# Patient Record
Sex: MALE | Race: OTHER | HISPANIC OR LATINO | Employment: UNEMPLOYED | URBAN - NONMETROPOLITAN AREA
[De-identification: names, ages, dates, MRNs, and addresses within clinical notes are randomized per-mention and may not be internally consistent; named-entity substitution may affect disease eponyms.]

---

## 2021-10-27 ENCOUNTER — EVALUATION (OUTPATIENT)
Dept: PHYSICAL THERAPY | Facility: CLINIC | Age: 51
End: 2021-10-27
Payer: OTHER MISCELLANEOUS

## 2021-10-27 DIAGNOSIS — M54.50 CHRONIC BILATERAL LOW BACK PAIN, UNSPECIFIED WHETHER SCIATICA PRESENT: ICD-10-CM

## 2021-10-27 DIAGNOSIS — Z98.1 S/P LUMBAR FUSION: Primary | ICD-10-CM

## 2021-10-27 DIAGNOSIS — M51.36 DEGENERATIVE DISC DISEASE, LUMBAR: ICD-10-CM

## 2021-10-27 DIAGNOSIS — G89.29 CHRONIC BILATERAL LOW BACK PAIN, UNSPECIFIED WHETHER SCIATICA PRESENT: ICD-10-CM

## 2021-10-27 PROCEDURE — 97162 PT EVAL MOD COMPLEX 30 MIN: CPT | Performed by: PHYSICAL THERAPIST

## 2021-10-27 PROCEDURE — 97110 THERAPEUTIC EXERCISES: CPT | Performed by: PHYSICAL THERAPIST

## 2021-10-27 PROCEDURE — 97014 ELECTRIC STIMULATION THERAPY: CPT | Performed by: PHYSICAL THERAPIST

## 2021-10-28 ENCOUNTER — OFFICE VISIT (OUTPATIENT)
Dept: PHYSICAL THERAPY | Facility: CLINIC | Age: 51
End: 2021-10-28
Payer: OTHER MISCELLANEOUS

## 2021-10-28 DIAGNOSIS — M54.50 CHRONIC BILATERAL LOW BACK PAIN, UNSPECIFIED WHETHER SCIATICA PRESENT: ICD-10-CM

## 2021-10-28 DIAGNOSIS — G89.29 CHRONIC BILATERAL LOW BACK PAIN, UNSPECIFIED WHETHER SCIATICA PRESENT: ICD-10-CM

## 2021-10-28 DIAGNOSIS — Z98.1 S/P LUMBAR FUSION: Primary | ICD-10-CM

## 2021-10-28 DIAGNOSIS — M51.36 DEGENERATIVE DISC DISEASE, LUMBAR: ICD-10-CM

## 2021-10-28 PROCEDURE — 97014 ELECTRIC STIMULATION THERAPY: CPT | Performed by: PHYSICAL THERAPIST

## 2021-10-28 PROCEDURE — 97140 MANUAL THERAPY 1/> REGIONS: CPT | Performed by: PHYSICAL THERAPIST

## 2021-10-28 PROCEDURE — 97112 NEUROMUSCULAR REEDUCATION: CPT | Performed by: PHYSICAL THERAPIST

## 2021-10-28 PROCEDURE — 97110 THERAPEUTIC EXERCISES: CPT | Performed by: PHYSICAL THERAPIST

## 2021-11-01 ENCOUNTER — OFFICE VISIT (OUTPATIENT)
Dept: PHYSICAL THERAPY | Facility: CLINIC | Age: 51
End: 2021-11-01
Payer: OTHER MISCELLANEOUS

## 2021-11-01 DIAGNOSIS — G89.29 CHRONIC BILATERAL LOW BACK PAIN, UNSPECIFIED WHETHER SCIATICA PRESENT: ICD-10-CM

## 2021-11-01 DIAGNOSIS — M51.36 DEGENERATIVE DISC DISEASE, LUMBAR: ICD-10-CM

## 2021-11-01 DIAGNOSIS — M54.50 CHRONIC BILATERAL LOW BACK PAIN, UNSPECIFIED WHETHER SCIATICA PRESENT: ICD-10-CM

## 2021-11-01 DIAGNOSIS — Z98.1 S/P LUMBAR FUSION: Primary | ICD-10-CM

## 2021-11-01 PROCEDURE — 97112 NEUROMUSCULAR REEDUCATION: CPT

## 2021-11-01 PROCEDURE — 97140 MANUAL THERAPY 1/> REGIONS: CPT

## 2021-11-01 PROCEDURE — 97014 ELECTRIC STIMULATION THERAPY: CPT

## 2021-11-01 PROCEDURE — 97110 THERAPEUTIC EXERCISES: CPT

## 2021-11-05 ENCOUNTER — OFFICE VISIT (OUTPATIENT)
Dept: PHYSICAL THERAPY | Facility: CLINIC | Age: 51
End: 2021-11-05
Payer: OTHER MISCELLANEOUS

## 2021-11-05 DIAGNOSIS — G89.29 CHRONIC BILATERAL LOW BACK PAIN, UNSPECIFIED WHETHER SCIATICA PRESENT: ICD-10-CM

## 2021-11-05 DIAGNOSIS — M51.36 DEGENERATIVE DISC DISEASE, LUMBAR: ICD-10-CM

## 2021-11-05 DIAGNOSIS — M54.50 CHRONIC BILATERAL LOW BACK PAIN, UNSPECIFIED WHETHER SCIATICA PRESENT: ICD-10-CM

## 2021-11-05 DIAGNOSIS — Z98.1 S/P LUMBAR FUSION: Primary | ICD-10-CM

## 2021-11-05 PROCEDURE — 97112 NEUROMUSCULAR REEDUCATION: CPT

## 2021-11-05 PROCEDURE — 97110 THERAPEUTIC EXERCISES: CPT

## 2021-11-05 PROCEDURE — 97014 ELECTRIC STIMULATION THERAPY: CPT

## 2021-11-05 PROCEDURE — 97140 MANUAL THERAPY 1/> REGIONS: CPT

## 2021-11-09 ENCOUNTER — OFFICE VISIT (OUTPATIENT)
Dept: PHYSICAL THERAPY | Facility: CLINIC | Age: 51
End: 2021-11-09
Payer: OTHER MISCELLANEOUS

## 2021-11-09 DIAGNOSIS — Z98.1 S/P LUMBAR FUSION: Primary | ICD-10-CM

## 2021-11-09 DIAGNOSIS — M51.36 DEGENERATIVE DISC DISEASE, LUMBAR: ICD-10-CM

## 2021-11-09 DIAGNOSIS — G89.29 CHRONIC BILATERAL LOW BACK PAIN, UNSPECIFIED WHETHER SCIATICA PRESENT: ICD-10-CM

## 2021-11-09 DIAGNOSIS — M54.50 CHRONIC BILATERAL LOW BACK PAIN, UNSPECIFIED WHETHER SCIATICA PRESENT: ICD-10-CM

## 2021-11-09 PROCEDURE — 97014 ELECTRIC STIMULATION THERAPY: CPT

## 2021-11-09 PROCEDURE — 97110 THERAPEUTIC EXERCISES: CPT

## 2021-11-09 PROCEDURE — 97112 NEUROMUSCULAR REEDUCATION: CPT

## 2021-11-09 PROCEDURE — 97140 MANUAL THERAPY 1/> REGIONS: CPT

## 2021-11-11 ENCOUNTER — OFFICE VISIT (OUTPATIENT)
Dept: PHYSICAL THERAPY | Facility: CLINIC | Age: 51
End: 2021-11-11
Payer: OTHER MISCELLANEOUS

## 2021-11-11 DIAGNOSIS — G89.29 CHRONIC BILATERAL LOW BACK PAIN, UNSPECIFIED WHETHER SCIATICA PRESENT: ICD-10-CM

## 2021-11-11 DIAGNOSIS — M54.50 CHRONIC BILATERAL LOW BACK PAIN, UNSPECIFIED WHETHER SCIATICA PRESENT: ICD-10-CM

## 2021-11-11 DIAGNOSIS — M51.36 DEGENERATIVE DISC DISEASE, LUMBAR: ICD-10-CM

## 2021-11-11 DIAGNOSIS — Z98.1 S/P LUMBAR FUSION: Primary | ICD-10-CM

## 2021-11-11 PROCEDURE — 97014 ELECTRIC STIMULATION THERAPY: CPT

## 2021-11-11 PROCEDURE — 97110 THERAPEUTIC EXERCISES: CPT

## 2021-11-11 PROCEDURE — 97112 NEUROMUSCULAR REEDUCATION: CPT

## 2021-11-11 PROCEDURE — 97140 MANUAL THERAPY 1/> REGIONS: CPT

## 2021-11-15 ENCOUNTER — OFFICE VISIT (OUTPATIENT)
Dept: PHYSICAL THERAPY | Facility: CLINIC | Age: 51
End: 2021-11-15
Payer: OTHER MISCELLANEOUS

## 2021-11-15 DIAGNOSIS — M54.50 CHRONIC BILATERAL LOW BACK PAIN, UNSPECIFIED WHETHER SCIATICA PRESENT: ICD-10-CM

## 2021-11-15 DIAGNOSIS — G89.29 CHRONIC BILATERAL LOW BACK PAIN, UNSPECIFIED WHETHER SCIATICA PRESENT: ICD-10-CM

## 2021-11-15 DIAGNOSIS — Z98.1 S/P LUMBAR FUSION: Primary | ICD-10-CM

## 2021-11-15 DIAGNOSIS — M51.36 DEGENERATIVE DISC DISEASE, LUMBAR: ICD-10-CM

## 2021-11-15 PROCEDURE — 97140 MANUAL THERAPY 1/> REGIONS: CPT | Performed by: PHYSICAL THERAPIST

## 2021-11-15 PROCEDURE — 97110 THERAPEUTIC EXERCISES: CPT | Performed by: PHYSICAL THERAPIST

## 2021-11-15 PROCEDURE — 97112 NEUROMUSCULAR REEDUCATION: CPT | Performed by: PHYSICAL THERAPIST

## 2021-11-15 PROCEDURE — 97014 ELECTRIC STIMULATION THERAPY: CPT | Performed by: PHYSICAL THERAPIST

## 2021-11-19 ENCOUNTER — EVALUATION (OUTPATIENT)
Dept: PHYSICAL THERAPY | Facility: CLINIC | Age: 51
End: 2021-11-19
Payer: OTHER MISCELLANEOUS

## 2021-11-19 DIAGNOSIS — Z98.1 S/P LUMBAR FUSION: Primary | ICD-10-CM

## 2021-11-19 DIAGNOSIS — G89.29 CHRONIC BILATERAL LOW BACK PAIN, UNSPECIFIED WHETHER SCIATICA PRESENT: ICD-10-CM

## 2021-11-19 DIAGNOSIS — M54.50 CHRONIC BILATERAL LOW BACK PAIN, UNSPECIFIED WHETHER SCIATICA PRESENT: ICD-10-CM

## 2021-11-19 DIAGNOSIS — M51.36 DEGENERATIVE DISC DISEASE, LUMBAR: ICD-10-CM

## 2021-11-19 PROCEDURE — 97014 ELECTRIC STIMULATION THERAPY: CPT | Performed by: PHYSICAL THERAPIST

## 2021-11-19 PROCEDURE — 97112 NEUROMUSCULAR REEDUCATION: CPT | Performed by: PHYSICAL THERAPIST

## 2021-11-19 PROCEDURE — 97140 MANUAL THERAPY 1/> REGIONS: CPT | Performed by: PHYSICAL THERAPIST

## 2021-11-19 PROCEDURE — 97110 THERAPEUTIC EXERCISES: CPT | Performed by: PHYSICAL THERAPIST

## 2021-11-23 ENCOUNTER — OFFICE VISIT (OUTPATIENT)
Dept: PHYSICAL THERAPY | Facility: CLINIC | Age: 51
End: 2021-11-23
Payer: OTHER MISCELLANEOUS

## 2021-11-23 DIAGNOSIS — M54.50 CHRONIC BILATERAL LOW BACK PAIN, UNSPECIFIED WHETHER SCIATICA PRESENT: ICD-10-CM

## 2021-11-23 DIAGNOSIS — Z98.1 S/P LUMBAR FUSION: Primary | ICD-10-CM

## 2021-11-23 DIAGNOSIS — G89.29 CHRONIC BILATERAL LOW BACK PAIN, UNSPECIFIED WHETHER SCIATICA PRESENT: ICD-10-CM

## 2021-11-23 DIAGNOSIS — M51.36 DEGENERATIVE DISC DISEASE, LUMBAR: ICD-10-CM

## 2021-11-23 PROCEDURE — 97112 NEUROMUSCULAR REEDUCATION: CPT

## 2021-11-23 PROCEDURE — 97110 THERAPEUTIC EXERCISES: CPT

## 2021-11-23 PROCEDURE — 97140 MANUAL THERAPY 1/> REGIONS: CPT

## 2021-11-23 PROCEDURE — 97014 ELECTRIC STIMULATION THERAPY: CPT

## 2021-11-26 ENCOUNTER — OFFICE VISIT (OUTPATIENT)
Dept: PHYSICAL THERAPY | Facility: CLINIC | Age: 51
End: 2021-11-26
Payer: OTHER MISCELLANEOUS

## 2021-11-26 DIAGNOSIS — M54.50 CHRONIC BILATERAL LOW BACK PAIN, UNSPECIFIED WHETHER SCIATICA PRESENT: ICD-10-CM

## 2021-11-26 DIAGNOSIS — Z98.1 S/P LUMBAR FUSION: Primary | ICD-10-CM

## 2021-11-26 DIAGNOSIS — M51.36 DEGENERATIVE DISC DISEASE, LUMBAR: ICD-10-CM

## 2021-11-26 DIAGNOSIS — G89.29 CHRONIC BILATERAL LOW BACK PAIN, UNSPECIFIED WHETHER SCIATICA PRESENT: ICD-10-CM

## 2021-11-26 PROCEDURE — 97014 ELECTRIC STIMULATION THERAPY: CPT | Performed by: PHYSICAL THERAPIST

## 2021-11-26 PROCEDURE — 97140 MANUAL THERAPY 1/> REGIONS: CPT | Performed by: PHYSICAL THERAPIST

## 2021-11-26 PROCEDURE — 97112 NEUROMUSCULAR REEDUCATION: CPT | Performed by: PHYSICAL THERAPIST

## 2021-11-26 PROCEDURE — 97110 THERAPEUTIC EXERCISES: CPT | Performed by: PHYSICAL THERAPIST

## 2021-11-30 ENCOUNTER — OFFICE VISIT (OUTPATIENT)
Dept: PHYSICAL THERAPY | Facility: CLINIC | Age: 51
End: 2021-11-30
Payer: OTHER MISCELLANEOUS

## 2021-11-30 DIAGNOSIS — Z98.1 S/P LUMBAR FUSION: Primary | ICD-10-CM

## 2021-11-30 DIAGNOSIS — M54.50 CHRONIC BILATERAL LOW BACK PAIN, UNSPECIFIED WHETHER SCIATICA PRESENT: ICD-10-CM

## 2021-11-30 DIAGNOSIS — M51.36 DEGENERATIVE DISC DISEASE, LUMBAR: ICD-10-CM

## 2021-11-30 DIAGNOSIS — G89.29 CHRONIC BILATERAL LOW BACK PAIN, UNSPECIFIED WHETHER SCIATICA PRESENT: ICD-10-CM

## 2021-11-30 PROCEDURE — 97112 NEUROMUSCULAR REEDUCATION: CPT

## 2021-11-30 PROCEDURE — 97140 MANUAL THERAPY 1/> REGIONS: CPT

## 2021-11-30 PROCEDURE — 97110 THERAPEUTIC EXERCISES: CPT

## 2021-12-03 ENCOUNTER — OFFICE VISIT (OUTPATIENT)
Dept: PHYSICAL THERAPY | Facility: CLINIC | Age: 51
End: 2021-12-03
Payer: OTHER MISCELLANEOUS

## 2021-12-03 DIAGNOSIS — Z98.1 S/P LUMBAR FUSION: Primary | ICD-10-CM

## 2021-12-03 DIAGNOSIS — M54.50 CHRONIC BILATERAL LOW BACK PAIN, UNSPECIFIED WHETHER SCIATICA PRESENT: ICD-10-CM

## 2021-12-03 DIAGNOSIS — G89.29 CHRONIC BILATERAL LOW BACK PAIN, UNSPECIFIED WHETHER SCIATICA PRESENT: ICD-10-CM

## 2021-12-03 DIAGNOSIS — M51.36 DEGENERATIVE DISC DISEASE, LUMBAR: ICD-10-CM

## 2021-12-03 PROCEDURE — 97110 THERAPEUTIC EXERCISES: CPT | Performed by: PHYSICAL THERAPIST

## 2021-12-03 PROCEDURE — 97112 NEUROMUSCULAR REEDUCATION: CPT | Performed by: PHYSICAL THERAPIST

## 2021-12-03 PROCEDURE — 97014 ELECTRIC STIMULATION THERAPY: CPT | Performed by: PHYSICAL THERAPIST

## 2021-12-03 PROCEDURE — 97140 MANUAL THERAPY 1/> REGIONS: CPT | Performed by: PHYSICAL THERAPIST

## 2021-12-06 ENCOUNTER — OFFICE VISIT (OUTPATIENT)
Dept: PHYSICAL THERAPY | Facility: CLINIC | Age: 51
End: 2021-12-06
Payer: OTHER MISCELLANEOUS

## 2021-12-06 DIAGNOSIS — M51.36 DEGENERATIVE DISC DISEASE, LUMBAR: ICD-10-CM

## 2021-12-06 DIAGNOSIS — Z98.1 S/P LUMBAR FUSION: Primary | ICD-10-CM

## 2021-12-06 DIAGNOSIS — M54.50 CHRONIC BILATERAL LOW BACK PAIN, UNSPECIFIED WHETHER SCIATICA PRESENT: ICD-10-CM

## 2021-12-06 DIAGNOSIS — G89.29 CHRONIC BILATERAL LOW BACK PAIN, UNSPECIFIED WHETHER SCIATICA PRESENT: ICD-10-CM

## 2021-12-06 PROCEDURE — 97140 MANUAL THERAPY 1/> REGIONS: CPT | Performed by: PHYSICAL THERAPIST

## 2021-12-06 PROCEDURE — 97110 THERAPEUTIC EXERCISES: CPT | Performed by: PHYSICAL THERAPIST

## 2021-12-06 PROCEDURE — 97014 ELECTRIC STIMULATION THERAPY: CPT | Performed by: PHYSICAL THERAPIST

## 2021-12-08 NOTE — PROGRESS NOTES
PT Re-Evaluation     Today's date: 2021  Patient name: Sky Crawford  : 1970  MRN: [id number]  Referring provider: Kyaw Berry*  Dx:   Encounter Diagnosis     ICD-10-CM    1  S/P lumbar fusion  Z98 1    2  Degenerative disc disease, lumbar  M51 36    3  Chronic bilateral low back pain, unspecified whether sciatica present  M54 50     G89 29                   Assessment  Assessment details: The patient has been seen in PT for a total of visits since [facility] with good PT attendance noted  He continues to make progress towards his goals  He has complaints of pain  He demonstrates deficits with decreased ROM and strength, decreased flexibility, decreased postural awareness, difficulty sleeping and pain and difficulty with completing his ADLs  The patient would benefit from continued PT to address deficits and improve function  Tx to include ROM, stretching, strengthening, modalities, HEP, pt education, postural ed, lifting/body mechanics, neuro re-ed, balance/proprioception Te, MT and equipment  Pt is a 46year old male presenting to Outpatient PT s/p initial back injury  with back surgery on 10/22/2019 for lumbar fusion L3 to S1 with disc spacers at L5-S1  Pt presents with script for BLE stretching strengthening core muscle strengthening modalities ROM and HEP  Pt has been seen for 8 visits with treatment consisting of manual stretching, nerve glides, ROM postural correction LE and core strengthening exercises following by MH and IFC to the low back  Pt has also been provided with HEP of stretching and core strengthening  Pt has made progress thus far with PT with 5 degree improvement in L-spine ROM and 20 degree improvement in SLR and hamstring flexibility  Pt with less radicular pain since beginning therapy however pain remains 3-5/10  Pt has demonstrated improved gait pattern with more equal weightbearing with use of SPC   Pt reporting improvement in lower body dressing and ability to perform bending  Pt still demonstrating radicular pain, limitation in L-spine ROM in all planes, core and LE weakness with limited tolerance for prolonged positioning therefore would benefit from continued PT to continue to address impairments and functional deficits  Impairments: abnormal gait, abnormal muscle tone, abnormal or restricted ROM, activity intolerance, impaired physical strength and pain with function    Symptom irritability: moderateUnderstanding of Dx/Px/POC: good   Prognosis: good  Prognosis details: Pt is Bulgarian speaking, spouse present to translate    Goals  STG's to be met in 3-4 weeks:  1  Decrease low back pain and R radicular symptoms by 25%- progressing  2  Increase bilateral L-spine pain by at least 5 degrees and hs flexibility by 5 degrees- met  3  Pt education for proper posture and pelvic positioning during functional activity- ongoing  4  Pt will ambulate with normal gait pattern with equal weightbearing with SPC- partially met  5  Abolish TTP R lumbar paraspinals and piriformis- not met    LTG's to be met by discharge:   1  Decreased pain to less than 2/10 with activity- not met  2  Maximize L-spine ROM in all planes and increase SLR bilaterally to 65 degrees- partially met  3  Increase core strength to 4+/5 and LE strength to 5/5 needed for (I) ADL's- not met  4  Increase standing and walking tolerance to at least 45 minutes before increase in pain- not met  5  Improve ability to perform lower body dressing without limitation- progressing  6  Increase Foto score to at least 45- not met  7   Pt will be (I) with HEP- ongoing and progressing    Plan  Plan details:    Patient would benefit from: skilled physical therapy  Planned modality interventions: thermotherapy: hydrocollator packs and unattended electrical stimulation  Other planned modality interventions: modalities to be added or modified at therapist discretion  Planned therapy interventions: abdominal trunk stabilization, manual therapy, neuromuscular re-education, patient education, postural training, therapeutic activities, strengthening, stretching, therapeutic exercise, home exercise program, flexibility and balance  Frequency: 2x week  Duration in weeks: 4  Plan of Care beginning date: 2021  Plan of Care expiration date: 2022  Treatment plan discussed with: patient and family        Subjective Evaluation    History of Present Illness  Date of surgery: 10/22/2019  Mechanism of injury: surgery  Mechanism of injury: Pt reports overall since beginning therapy he is a little better but he still has days where he has more pain than other days       UPDATE 21:          Not a recurrent problem   Quality of life: good    Pain  Current pain ratin  At best pain rating: 3  At worst pain ratin  Location: low back and R LE to foot  Relieving factors: medications  Aggravating factors: walking and lifting    Treatments  Previous treatment: physical therapy  Current treatment: physical therapy  Patient Goals  Patient goals for therapy: decreased pain, increased motion, increased strength, independence with ADLs/IADLs and return to sport/leisure activities          Objective     Postural Observations    Additional Postural Observation Details  Pt ambulates with % time before surgery and since surgery- still ambulating with SPC  Gait is with limp and SPC with decreased weightbearing R LE- Gait is improved with equal weightbearing B/L LE's    Pt performs bed mobility guarded with rolling technique  Pt guarded with sit to stand transfers performing transfer slowly- improving but still transfers slowly with use of SPC    Decreased lumbar lordosis in sitting and standing with poor tolerance for Lumbar ROM    Tenderness     Additional Tenderness Details  TTP R lumbar paraspinals at L4-5 level  - still present  TTP R pirfiromis- still present  No TTP L lumbar paraspinals or piriformis    Neurological Testing     Sensation     Lumbar   Left   Intact: light touch    Right   Intact: light touch    Additional Neurological Details  Pt reports intermittent burning R lateral thigh    Active Range of Motion     Lumbar   Flexion: 10 degrees  with pain  Extension: 10 degrees  with pain  Left lateral flexion: 10 degrees    with pain  Right lateral flexion: 10 degrees     Additional Active Range of Motion Details  Bilateral knee and ankle ROM WFL  Seated hip flex L 102 deg R 95 deg  IR/ER PROM WFL  Unable to cross L over R due to low back pain- improved but still unable  Unable to cross R over L due to low back pain- improved but still unable    SLR: R- 67 deg L 68 deg  Piriformis: unable to obtain piriformis stretch due to back pain with hip flexion at 90 degrees- now able to tolerate piriformis stretching    Strength/Myotome Testing     Additional Strength Details  Bilateral hip flex  R 4+/5 L 4+/5                      abd  R 5/5 L 5/5                      Add  R 4+/5 L 4+/5  Knee  Ext  R 4/5  L 4+/5             Flex  R 4+/5 L 4+/5  Ankle   Df    R 4+/5 L 4+/5              PF   R 4/5 L 4/5    Abdominal muscle: 4-/5  Lumbar parapsinals: 3+/5    General Comments:      Lumbar Comments  Sitting tolerance 25 minutes with increased pain- same  Walking tolerance 15 minutes with increased pain- same  Difficulty performing bending- pt notes a little better but still difficult  Difficulty with lower body dressing due to pain- pt reports improvement in donning pants  Ascends descends stairs with step to pattern due to back- pt reports improvement in stair negotiation  Foto score: 28- increased to 30

## 2021-12-09 ENCOUNTER — OFFICE VISIT (OUTPATIENT)
Dept: PHYSICAL THERAPY | Facility: CLINIC | Age: 51
End: 2021-12-09
Payer: OTHER MISCELLANEOUS

## 2021-12-09 DIAGNOSIS — G89.29 CHRONIC BILATERAL LOW BACK PAIN, UNSPECIFIED WHETHER SCIATICA PRESENT: ICD-10-CM

## 2021-12-09 DIAGNOSIS — M54.50 CHRONIC BILATERAL LOW BACK PAIN, UNSPECIFIED WHETHER SCIATICA PRESENT: ICD-10-CM

## 2021-12-09 DIAGNOSIS — M51.36 DEGENERATIVE DISC DISEASE, LUMBAR: ICD-10-CM

## 2021-12-09 DIAGNOSIS — Z98.1 S/P LUMBAR FUSION: Primary | ICD-10-CM

## 2021-12-09 PROCEDURE — 97110 THERAPEUTIC EXERCISES: CPT

## 2021-12-09 PROCEDURE — 97014 ELECTRIC STIMULATION THERAPY: CPT

## 2021-12-09 PROCEDURE — 97140 MANUAL THERAPY 1/> REGIONS: CPT

## 2021-12-10 ENCOUNTER — APPOINTMENT (OUTPATIENT)
Dept: PHYSICAL THERAPY | Facility: CLINIC | Age: 51
End: 2021-12-10
Payer: OTHER MISCELLANEOUS

## 2021-12-14 ENCOUNTER — OFFICE VISIT (OUTPATIENT)
Dept: PHYSICAL THERAPY | Facility: CLINIC | Age: 51
End: 2021-12-14
Payer: OTHER MISCELLANEOUS

## 2021-12-14 DIAGNOSIS — Z98.1 S/P LUMBAR FUSION: Primary | ICD-10-CM

## 2021-12-14 DIAGNOSIS — M51.36 DEGENERATIVE DISC DISEASE, LUMBAR: ICD-10-CM

## 2021-12-14 DIAGNOSIS — M54.50 CHRONIC BILATERAL LOW BACK PAIN, UNSPECIFIED WHETHER SCIATICA PRESENT: ICD-10-CM

## 2021-12-14 DIAGNOSIS — G89.29 CHRONIC BILATERAL LOW BACK PAIN, UNSPECIFIED WHETHER SCIATICA PRESENT: ICD-10-CM

## 2021-12-14 PROCEDURE — 97140 MANUAL THERAPY 1/> REGIONS: CPT | Performed by: PHYSICAL THERAPIST

## 2021-12-14 PROCEDURE — 97110 THERAPEUTIC EXERCISES: CPT | Performed by: PHYSICAL THERAPIST

## 2021-12-14 PROCEDURE — 97014 ELECTRIC STIMULATION THERAPY: CPT | Performed by: PHYSICAL THERAPIST

## 2021-12-16 ENCOUNTER — EVALUATION (OUTPATIENT)
Dept: PHYSICAL THERAPY | Facility: CLINIC | Age: 51
End: 2021-12-16
Payer: OTHER MISCELLANEOUS

## 2021-12-16 DIAGNOSIS — M54.50 CHRONIC BILATERAL LOW BACK PAIN, UNSPECIFIED WHETHER SCIATICA PRESENT: ICD-10-CM

## 2021-12-16 DIAGNOSIS — M51.36 DEGENERATIVE DISC DISEASE, LUMBAR: ICD-10-CM

## 2021-12-16 DIAGNOSIS — G89.29 CHRONIC BILATERAL LOW BACK PAIN, UNSPECIFIED WHETHER SCIATICA PRESENT: ICD-10-CM

## 2021-12-16 DIAGNOSIS — Z98.1 S/P LUMBAR FUSION: Primary | ICD-10-CM

## 2021-12-16 PROCEDURE — 97140 MANUAL THERAPY 1/> REGIONS: CPT | Performed by: PHYSICAL THERAPIST

## 2021-12-16 PROCEDURE — 97014 ELECTRIC STIMULATION THERAPY: CPT | Performed by: PHYSICAL THERAPIST

## 2021-12-16 PROCEDURE — 97110 THERAPEUTIC EXERCISES: CPT | Performed by: PHYSICAL THERAPIST

## 2021-12-17 ENCOUNTER — APPOINTMENT (OUTPATIENT)
Dept: PHYSICAL THERAPY | Facility: CLINIC | Age: 51
End: 2021-12-17
Payer: OTHER MISCELLANEOUS

## 2021-12-20 ENCOUNTER — OFFICE VISIT (OUTPATIENT)
Dept: PHYSICAL THERAPY | Facility: CLINIC | Age: 51
End: 2021-12-20
Payer: OTHER MISCELLANEOUS

## 2021-12-20 DIAGNOSIS — M51.36 DEGENERATIVE DISC DISEASE, LUMBAR: ICD-10-CM

## 2021-12-20 DIAGNOSIS — G89.29 CHRONIC BILATERAL LOW BACK PAIN, UNSPECIFIED WHETHER SCIATICA PRESENT: ICD-10-CM

## 2021-12-20 DIAGNOSIS — Z98.1 S/P LUMBAR FUSION: Primary | ICD-10-CM

## 2021-12-20 DIAGNOSIS — M54.50 CHRONIC BILATERAL LOW BACK PAIN, UNSPECIFIED WHETHER SCIATICA PRESENT: ICD-10-CM

## 2021-12-20 PROCEDURE — 97014 ELECTRIC STIMULATION THERAPY: CPT | Performed by: PHYSICAL THERAPIST

## 2021-12-20 PROCEDURE — 97140 MANUAL THERAPY 1/> REGIONS: CPT | Performed by: PHYSICAL THERAPIST

## 2021-12-20 PROCEDURE — 97110 THERAPEUTIC EXERCISES: CPT | Performed by: PHYSICAL THERAPIST

## 2021-12-23 ENCOUNTER — OFFICE VISIT (OUTPATIENT)
Dept: PHYSICAL THERAPY | Facility: CLINIC | Age: 51
End: 2021-12-23
Payer: OTHER MISCELLANEOUS

## 2021-12-23 DIAGNOSIS — M54.50 CHRONIC BILATERAL LOW BACK PAIN, UNSPECIFIED WHETHER SCIATICA PRESENT: ICD-10-CM

## 2021-12-23 DIAGNOSIS — G89.29 CHRONIC BILATERAL LOW BACK PAIN, UNSPECIFIED WHETHER SCIATICA PRESENT: ICD-10-CM

## 2021-12-23 DIAGNOSIS — Z98.1 S/P LUMBAR FUSION: Primary | ICD-10-CM

## 2021-12-23 DIAGNOSIS — M51.36 DEGENERATIVE DISC DISEASE, LUMBAR: ICD-10-CM

## 2021-12-23 PROCEDURE — 97140 MANUAL THERAPY 1/> REGIONS: CPT | Performed by: PHYSICAL THERAPIST

## 2021-12-23 PROCEDURE — 97014 ELECTRIC STIMULATION THERAPY: CPT | Performed by: PHYSICAL THERAPIST

## 2021-12-23 PROCEDURE — 97110 THERAPEUTIC EXERCISES: CPT | Performed by: PHYSICAL THERAPIST

## 2021-12-23 PROCEDURE — 97112 NEUROMUSCULAR REEDUCATION: CPT | Performed by: PHYSICAL THERAPIST

## 2021-12-28 ENCOUNTER — OFFICE VISIT (OUTPATIENT)
Dept: PHYSICAL THERAPY | Facility: CLINIC | Age: 51
End: 2021-12-28
Payer: OTHER MISCELLANEOUS

## 2021-12-28 DIAGNOSIS — G89.29 CHRONIC BILATERAL LOW BACK PAIN, UNSPECIFIED WHETHER SCIATICA PRESENT: ICD-10-CM

## 2021-12-28 DIAGNOSIS — M51.36 DEGENERATIVE DISC DISEASE, LUMBAR: ICD-10-CM

## 2021-12-28 DIAGNOSIS — M54.50 CHRONIC BILATERAL LOW BACK PAIN, UNSPECIFIED WHETHER SCIATICA PRESENT: ICD-10-CM

## 2021-12-28 DIAGNOSIS — Z98.1 S/P LUMBAR FUSION: Primary | ICD-10-CM

## 2021-12-28 PROCEDURE — 97110 THERAPEUTIC EXERCISES: CPT

## 2021-12-28 PROCEDURE — 97140 MANUAL THERAPY 1/> REGIONS: CPT

## 2021-12-28 PROCEDURE — 97112 NEUROMUSCULAR REEDUCATION: CPT

## 2021-12-28 PROCEDURE — 97014 ELECTRIC STIMULATION THERAPY: CPT

## 2021-12-30 ENCOUNTER — OFFICE VISIT (OUTPATIENT)
Dept: PHYSICAL THERAPY | Facility: CLINIC | Age: 51
End: 2021-12-30
Payer: OTHER MISCELLANEOUS

## 2021-12-30 DIAGNOSIS — M51.36 DEGENERATIVE DISC DISEASE, LUMBAR: ICD-10-CM

## 2021-12-30 DIAGNOSIS — M54.50 CHRONIC BILATERAL LOW BACK PAIN, UNSPECIFIED WHETHER SCIATICA PRESENT: ICD-10-CM

## 2021-12-30 DIAGNOSIS — G89.29 CHRONIC BILATERAL LOW BACK PAIN, UNSPECIFIED WHETHER SCIATICA PRESENT: ICD-10-CM

## 2021-12-30 DIAGNOSIS — Z98.1 S/P LUMBAR FUSION: Primary | ICD-10-CM

## 2021-12-30 PROCEDURE — 97112 NEUROMUSCULAR REEDUCATION: CPT

## 2021-12-30 PROCEDURE — 97014 ELECTRIC STIMULATION THERAPY: CPT

## 2021-12-30 PROCEDURE — 97110 THERAPEUTIC EXERCISES: CPT

## 2021-12-30 PROCEDURE — 97140 MANUAL THERAPY 1/> REGIONS: CPT

## 2022-01-05 ENCOUNTER — APPOINTMENT (OUTPATIENT)
Dept: PHYSICAL THERAPY | Facility: CLINIC | Age: 52
End: 2022-01-05
Payer: OTHER MISCELLANEOUS

## 2022-01-06 ENCOUNTER — APPOINTMENT (OUTPATIENT)
Dept: PHYSICAL THERAPY | Facility: CLINIC | Age: 52
End: 2022-01-06
Payer: OTHER MISCELLANEOUS

## 2022-01-10 ENCOUNTER — APPOINTMENT (OUTPATIENT)
Dept: PHYSICAL THERAPY | Facility: CLINIC | Age: 52
End: 2022-01-10
Payer: OTHER MISCELLANEOUS

## 2022-01-10 NOTE — PROGRESS NOTES
PT Re-Evaluation     Today's date: 1/10/2022  Patient name: Charity Coyne  : 1970  MRN: [id number]  Referring provider: Jennifer Billings*  Dx:   Encounter Diagnosis     ICD-10-CM    1  S/P lumbar fusion  Z98 1    2  Degenerative disc disease, lumbar  M51 36    3  Chronic bilateral low back pain, unspecified whether sciatica present  M54 50     G89 29                   Assessment  Assessment details: The patient has been seen in PT for a total of visits since [facility] with good PT attendance noted  He has complains of constant pain along lower back and down RLE  He demonstrates deficits with decreased L/S ROM and strength, decreased flexibility, decreased postural awareness, difficulty sleeping and pain and difficulty with completing his ADLs  The patient has been seen in PT for a total of 16 visits since [facility] with good PT attendance noted  He continues to make slow and steady progress towards his goals  He has complaints of constant pain along lower back and down RLE  He demonstrates deficits with decreased L/S ROM and strength, decreased flexibility, decreased postural awareness, difficulty sleeping and pain and difficulty with completing his ADLs  He still has pain with bending forward, family will assist with shoes and socks  He ambulates with [facility] for household and community distances with improved gait mechanics though slow chi noted  He is slow with transitions, such as sit to stand and supine to/from seated position  Secondary to surgery and above deficits he is limited with his overall mobility and function  The patient would benefit from continued PT to address deficits and improve function  Tx to include ROM, stretching, strengthening, modalities, HEP, pt education, postural ed, lifting/body mechanics, neuro re-ed, balance/proprioception Te, MT and equipment    Plan to continue with PT and will progress as able in upcoming visits with ROM, stretching, strengthening, flexibility, postural awareness and HEP  Impairments: abnormal gait, abnormal muscle tone, abnormal or restricted ROM, activity intolerance, impaired physical strength and pain with function    Symptom irritability: moderateUnderstanding of Dx/Px/POC: good   Prognosis: good  Prognosis details: Pt is Faroese speaking, spouse present to translate    Goals  STG's to be met in 3-4 weeks:  1  Decrease low back pain and R radicular symptoms by 25%- progressing  2  Increase bilateral L-spine pain by at least 5 degrees and hs flexibility by 5 degrees- met  3  Pt education for proper posture and pelvic positioning during functional activity- ongoing  4  Pt will ambulate with normal gait pattern with equal weightbearing with SPC- partially met  5  Abolish TTP R lumbar paraspinals and piriformis- not met    LTG's to be met by discharge:   1  Decreased pain to less than 2/10 with activity- not met  2  Maximize L-spine ROM in all planes and increase SLR bilaterally to 65 degrees- partially met  3  Increase core strength to 4+/5 and LE strength to 5/5 needed for (I) ADL's- not met  4  Increase standing and walking tolerance to at least 45 minutes before increase in pain- not met  5  Improve ability to perform lower body dressing without limitation- progressing  6  Increase Foto score to at least 45- progressing  7  Pt will be (I) with HEP- ongoing and progressing    Plan  Plan details: Modalities and therapy interventions prn      Patient would benefit from: skilled physical therapy  Planned modality interventions: thermotherapy: hydrocollator packs and unattended electrical stimulation  Other planned modality interventions: modalities to be added or modified at therapist discretion  Planned therapy interventions: abdominal trunk stabilization, manual therapy, neuromuscular re-education, patient education, postural training, therapeutic activities, strengthening, stretching, therapeutic exercise, home exercise program, flexibility, balance, gait training and self care  Frequency: 2x week  Duration in weeks: 4  Plan of Care beginning date: 2022  Plan of Care expiration date: 2022  Treatment plan discussed with: patient and family        Subjective Evaluation    History of Present Illness  Date of surgery: 10/22/2019  Mechanism of injury: surgery  Mechanism of injury: Pt reports overall since beginning therapy he is a little better but he still has days where he has more pain than other days  UPDATE 21:  The patient states that therapy has been helping  He still has good days and bad days with regards to pain  He will be going back to see the doctor in 2022 for his next appointment              Not a recurrent problem   Quality of life: good    Pain  At best pain rating: 3  At worst pain ratin  Location: low back and R LE to foot  Relieving factors: medications  Aggravating factors: walking and lifting    Treatments  Previous treatment: physical therapy  Current treatment: physical therapy  Patient Goals  Patient goals for therapy: decreased pain, increased motion, increased strength, independence with ADLs/IADLs and return to sport/leisure activities          Objective     Postural Observations    Additional Postural Observation Details  Pt ambulates with % time before surgery and since surgery- still ambulating with SPC for household and community distances  Gait is with limp and SPC with decreased weightbearing R LE- Gait is improved with equal weightbearing B/L LE's, though slow chi noted    Pt performs bed mobility guarded with rolling technique  Pt guarded with sit to stand transfers performing transfer slowly- improving but still transfers slowly with use of SPC    Decreased lumbar lordosis in sitting and standing with poor tolerance for Lumbar ROM    Tenderness     Additional Tenderness Details  TTP R lumbar paraspinals at L4-5 level  - still present  TTP R pirfiromis- still present  No TTP L lumbar paraspinals or piriformis    Neurological Testing     Sensation     Lumbar   Left   Intact: light touch    Right   Intact: light touch    Additional Neurological Details  Pt reports intermittent burning R lateral thigh    Active Range of Motion     Lumbar   Flexion: 20 degrees  with pain  Extension: 10 degrees  with pain  Left lateral flexion: 15 degrees    with pain  Right lateral flexion: 15 degrees     Additional Active Range of Motion Details  Bilateral knee and ankle ROM WFL  Seated hip flex L 102 deg R 100 deg  IR/ER PROM WFL  Unable to cross L over R due to low back pain- improved but still unable  Unable to cross R over L due to low back pain- improved but still unable    SLR: R- 67 deg L 68 deg  Piriformis: unable to obtain piriformis stretch due to back pain with hip flexion at 90 degrees- now able to tolerate piriformis stretching    Strength/Myotome Testing     Additional Strength Details  Bilateral hip flex  R 4+/5 L 4+/5                      abd  R 5/5 L 5/5                      Add  R 4+/5 L 4+/5  Knee  Ext  R 4/5  L 4+/5             Flex  R 4+/5 L 4+/5  Ankle   Df    R 4+/5 L 4+/5              PF   R 4/5 L 4/5    Abdominal muscle: 4-/5  Lumbar parapsinals: 3+/5    Ambulation   Weight-Bearing Status   Assistive device used: single point cane    Additional Weight-Bearing Status Details  SPC for community distances, at times without AD in the house  Observational Gait   Decreased walking speed       General Comments:      Lumbar Comments  Sitting tolerance 25 minutes with increased pain- same  Walking tolerance 15 minutes with increased pain- same  Difficulty performing bending- pt notes a little better but still difficult  Difficulty with lower body dressing due to pain- pt reports improvement in donning pants  Ascends descends stairs with step to pattern due to back- pt reports improvement in stair negotiation  Foto score: 28- increased to 30 - increased to 36

## 2022-01-13 ENCOUNTER — APPOINTMENT (OUTPATIENT)
Dept: PHYSICAL THERAPY | Facility: CLINIC | Age: 52
End: 2022-01-13
Payer: OTHER MISCELLANEOUS

## 2022-01-17 ENCOUNTER — APPOINTMENT (OUTPATIENT)
Dept: PHYSICAL THERAPY | Facility: CLINIC | Age: 52
End: 2022-01-17
Payer: OTHER MISCELLANEOUS

## 2022-01-17 NOTE — PROGRESS NOTES
PT Re-Evaluation     Today's date: 2022  Patient name: Gill Estevez  : 1970  MRN: [id number]  Referring provider: Tamy Duron*  Dx:   Encounter Diagnosis     ICD-10-CM    1  S/P lumbar fusion  Z98 1    2  Degenerative disc disease, lumbar  M51 36    3  Chronic bilateral low back pain, unspecified whether sciatica present  M54 50     G89 29                   Assessment  Assessment details: The patient has been seen in PT for a total of 16 visits since [facility], today is his first visit since 21 secondary to [facility]  He continues to make slow and steady progress towards his goals  He has complaints of constant pain along lower back and down RLE  He demonstrates deficits with decreased L/S ROM and strength, decreased flexibility, decreased postural awareness, difficulty sleeping and pain and difficulty with completing his ADLs  He still has pain with bending forward, family will assist with shoes and socks  He ambulates with [facility] for household and community distances with improved gait mechanics though slow chi noted  He is slow with transitions, such as sit to stand and supine to/from seated position  Secondary to surgery and above deficits he is limited with his overall mobility and function  The patient would benefit from continued PT to address deficits and improve function  Tx to include ROM, stretching, strengthening, modalities, HEP, pt education, postural ed, lifting/body mechanics, neuro re-ed, balance/proprioception Te, MT and equipment  Plan to continue with PT and will progress as able in upcoming visits with ROM, stretching, strengthening, flexibility, postural awareness and HEP        Impairments: abnormal gait, abnormal muscle tone, abnormal or restricted ROM, activity intolerance, impaired physical strength and pain with function    Symptom irritability: moderateUnderstanding of Dx/Px/POC: good   Prognosis: good  Prognosis details: Pt is English speaking, spouse present to translate    Goals  STG's to be met in 3-4 weeks:  1  Decrease low back pain and R radicular symptoms by 25%- progressing  2  Increase bilateral L-spine pain by at least 5 degrees and hs flexibility by 5 degrees- met  3  Pt education for proper posture and pelvic positioning during functional activity- ongoing  4  Pt will ambulate with normal gait pattern with equal weightbearing with SPC- partially met  5  Abolish TTP R lumbar paraspinals and piriformis- not met    LTG's to be met by discharge:   1  Decreased pain to less than 2/10 with activity- not met  2  Maximize L-spine ROM in all planes and increase SLR bilaterally to 65 degrees- partially met  3  Increase core strength to 4+/5 and LE strength to 5/5 needed for (I) ADL's- not met  4  Increase standing and walking tolerance to at least 45 minutes before increase in pain- not met  5  Improve ability to perform lower body dressing without limitation- progressing  6  Increase Foto score to at least 45- progressing  7  Pt will be (I) with HEP- ongoing and progressing    Plan  Plan details: Modalities and therapy interventions prn      Patient would benefit from: skilled physical therapy  Planned modality interventions: thermotherapy: hydrocollator packs and unattended electrical stimulation  Other planned modality interventions: modalities to be added or modified at therapist discretion  Planned therapy interventions: abdominal trunk stabilization, manual therapy, neuromuscular re-education, patient education, postural training, therapeutic activities, strengthening, stretching, therapeutic exercise, home exercise program, flexibility, balance, gait training and self care  Frequency: 2x week  Duration in weeks: 4  Plan of Care beginning date: 1/17/2022  Plan of Care expiration date: 2/14/2022  Treatment plan discussed with: patient and family        Subjective Evaluation    History of Present Illness  Date of surgery: 10/22/2019  Mechanism of injury: surgery  Mechanism of injury: Pt reports overall since beginning therapy he is a little better but he still has days where he has more pain than other days  UPDATE 21:  The patient states that therapy has been helping  He still has good days and bad days with regards to pain  He will be going back to see the doctor in 2022 for his next appointment              Not a recurrent problem   Quality of life: good    Pain  At best pain rating: 3  At worst pain ratin  Location: low back and R LE to foot  Relieving factors: medications  Aggravating factors: walking and lifting    Treatments  Previous treatment: physical therapy  Current treatment: physical therapy  Patient Goals  Patient goals for therapy: decreased pain, increased motion, increased strength, independence with ADLs/IADLs and return to sport/leisure activities          Objective     Postural Observations    Additional Postural Observation Details  Pt ambulates with % time before surgery and since surgery- still ambulating with SPC for household and community distances  Gait is with limp and SPC with decreased weightbearing R LE- Gait is improved with equal weightbearing B/L LE's, though slow chi noted    Pt performs bed mobility guarded with rolling technique  Pt guarded with sit to stand transfers performing transfer slowly- improving but still transfers slowly with use of SPC    Decreased lumbar lordosis in sitting and standing with poor tolerance for Lumbar ROM    Tenderness     Additional Tenderness Details  TTP R lumbar paraspinals at L4-5 level  - still present  TTP R pirfiromis- still present  No TTP L lumbar paraspinals or piriformis    Neurological Testing     Sensation     Lumbar   Left   Intact: light touch    Right   Intact: light touch    Additional Neurological Details  Pt reports intermittent burning R lateral thigh    Active Range of Motion     Lumbar   Flexion: 20 degrees  with pain  Extension: 10 degrees  with pain  Left lateral flexion: 15 degrees    with pain  Right lateral flexion: 15 degrees     Additional Active Range of Motion Details  Bilateral knee and ankle ROM WFL  Seated hip flex L 102 deg R 100 deg  IR/ER PROM WFL  Unable to cross L over R due to low back pain- improved but still unable  Unable to cross R over L due to low back pain- improved but still unable    SLR: R- 67 deg L 68 deg  Piriformis: unable to obtain piriformis stretch due to back pain with hip flexion at 90 degrees- now able to tolerate piriformis stretching    Strength/Myotome Testing     Additional Strength Details  Bilateral hip flex  R 4+/5 L 4+/5                      abd  R 5/5 L 5/5                      Add  R 4+/5 L 4+/5  Knee  Ext  R 4/5  L 4+/5             Flex  R 4+/5 L 4+/5  Ankle   Df    R 4+/5 L 4+/5              PF   R 4/5 L 4/5    Abdominal muscle: 4-/5  Lumbar parapsinals: 3+/5    Ambulation   Weight-Bearing Status   Assistive device used: single point cane    Additional Weight-Bearing Status Details  SPC for community distances, at times without AD in the house  Observational Gait   Decreased walking speed  General Comments:      Lumbar Comments  Sitting tolerance 25 minutes with increased pain- same  Walking tolerance 15 minutes with increased pain- same  Difficulty performing bending- pt notes a little better but still difficult  Difficulty with lower body dressing due to pain- pt reports improvement in donning pants  Ascends descends stairs with step to pattern due to back- pt reports improvement in stair negotiation  Foto score: 28- increased to 30 - increased to 36               Daily Note     Today's date: 2022  Patient name: Vicente Bernal  : 1970  MRN: [id number]  Referring provider: CARMEN Vergara*  Dx:   Encounter Diagnosis     ICD-10-CM    1  S/P lumbar fusion  Z98 1    2  Degenerative disc disease, lumbar  M51 36    3   Chronic bilateral low back pain, unspecified whether sciatica present  M54 50     G89 29                   Subjective: Patient stated feeling "a little better today," with a pain decrease, 4/10  Objective: See treatment diary below      Assessment: Patient did well with today's treatment, some fatigue demonstrated in B LE's post seated TE ; completed without pain increase  Less tightness in R HS this session noted  Seated for IFC post treat ; skin in tact pre and post      Plan: Continue per plan of care        Precautions: 10/22/2019 L3-S1 fusion     Date 1/7 12/20 12/23 12/28 12/30   Visit 21 17 18 19 20   Pain  5/10 5/10 5 4   Manuals        Manual calf, hamstring, piriformis str ML ML ML KW KW   Neuro Re-Ed        PPT 30x 3" 30x 3" 30x 3" 30x 3" 30x 3"    PPT w/ march 3# 2/20 3# 2/20 3#  2/20 3# 2/20 3#    PPT with SLR 3# 2/15 3# 2/15 3# 2/15 3# 2/15 3# 2/15   PPT with opp arm/leg        Wall sags        Seated nerve glides Bilaterally 10x 5" Braxton x10 ea 5" hold 10x 5" B 10x 5" B  10x 5" B    Standing bilateral hip flex ext abd        T-band NASRIN/ROW L5 2/15 ea L5 2/15 L5 2/15 L5 2/15 L5 2/15   Ther Ex        LTR 3# 2/20 3# 2/20 3# 2/20 3# 2/20 3# 2/20   SAQ 3# 2/15   3# 2/15 3# 2/15   Clamshells L5 2/15 L5 2/15 L5 2/15 L5 2/15 L5 2/15    Adductor squeeze 30x 3" 30x 3" 30x 3" 30x 3" 30x 3"    LAQ's 3# 2/15 3# 2/15 3# 2/15 3# 2/15 3# 2/15   T-band hs curls L5 2/15 L5 2/15 L5 2/15 L5 2/15 L5 2/15   Nu-Step L4 10 min L4 10 mins L4 10 mins 10m L4 L4 10 mins   Ther Activity        Gait Training        Modalities        HP w/ IFC low back 15 min seated 15 mins seated 15 mins seated  15 min seated  15 min seated

## 2022-01-17 NOTE — PROGRESS NOTES
PT Re-Evaluation     Today's date: 2022  Patient name: Cliff Colon  : 1970  MRN: [id number]  Referring provider: Mickie Patterson*  Dx:   Encounter Diagnosis     ICD-10-CM    1  S/P lumbar fusion  Z98 1    2  Degenerative disc disease, lumbar  M51 36    3  Chronic bilateral low back pain, unspecified whether sciatica present  M54 50     G89 29                   Assessment  Assessment details: The patient has been seen in PT for a total of 21 visits since [facility], today is his first visit since 21 secondary to having COVID  He has only been seen for a total of 4 visits since his last re-eval   He has complaints of constant pain along lower back and down RLE, notes increased pain since he has been here for his last treatment  He demonstrates deficits with decreased L/S ROM and strength, decreased flexibility, decreased postural awareness, difficulty sleeping and pain and difficulty with completing his ADLs and tasks at home  He still has pain with bending forward, family will assist with shoes and socks  He ambulates with [facility] for household and community distances with improved gait mechanics though slow chi noted  He is slow with transitions, such as sit to stand and supine to/from seated position  Secondary to surgery and above deficits he remains limited with his overall mobility and function  The patient may benefit from continued PT to address deficits and improve function  Tx to include ROM, stretching, strengthening, modalities, HEP, pt education, postural ed, lifting/body mechanics, neuro re-ed, balance/proprioception Te, MT and equipment  Plan to continue with PT and will progress as able in upcoming visits with ROM, stretching, strengthening, flexibility, postural awareness and HEP        Impairments: abnormal gait, abnormal muscle tone, abnormal or restricted ROM, activity intolerance, impaired physical strength and pain with function    Symptom irritability: moderateUnderstanding of Dx/Px/POC: good   Prognosis: good  Prognosis details: Pt is Bahamian speaking, spouse present to translate    Goals  STG's to be met in 3-4 weeks:  1  Decrease low back pain and R radicular symptoms by 25%- progressing  2  Increase bilateral L-spine pain by at least 5 degrees and hs flexibility by 5 degrees- met  3  Pt education for proper posture and pelvic positioning during functional activity- ongoing  4  Pt will ambulate with normal gait pattern with equal weightbearing with SPC- partially met  5  Abolish TTP R lumbar paraspinals and piriformis- not met    LTG's to be met by discharge:   1  Decreased pain to less than 2/10 with activity- not met  2  Maximize L-spine ROM in all planes and increase SLR bilaterally to 65 degrees- partially met  3  Increase core strength to 4+/5 and LE strength to 5/5 needed for (I) ADL's- not met  4  Increase standing and walking tolerance to at least 45 minutes before increase in pain- not met  5  Improve ability to perform lower body dressing without limitation- progressing  6  Increase Foto score to at least 45- progressing  7  Pt will be (I) with HEP- ongoing and progressing    Plan  Plan details: Modalities and therapy interventions prn      Patient would benefit from: skilled physical therapy  Planned modality interventions: thermotherapy: hydrocollator packs and unattended electrical stimulation  Other planned modality interventions: modalities to be added or modified at therapist discretion  Planned therapy interventions: abdominal trunk stabilization, manual therapy, neuromuscular re-education, patient education, postural training, therapeutic activities, strengthening, stretching, therapeutic exercise, home exercise program, flexibility, balance, gait training and self care  Frequency: 2x week  Duration in weeks: 4  Plan of Care beginning date: 1/18/2022  Plan of Care expiration date: 2/15/2022  Treatment plan discussed with: patient and family        Subjective Evaluation    History of Present Illness  Date of surgery: 10/22/2019  Mechanism of injury: surgery  Mechanism of injury: Pt reports overall since beginning therapy he is a little better but he still has days where he has more pain than other days  UPDATE 21:  The patient states that therapy has been helping  He still has good days and bad days with regards to pain  He will be going back to see the doctor in 2022 for his next appointment  UPDATE 22: The patient's wife present t/o treatment and re-eval   Patient notes increased pain in lower back and into RLE since he was here last for treatment  He has not been in PT since 21 secondary to having COVID  He now presents back for treatment  His follow up appointment with the doctor is in 2022              Not a recurrent problem   Quality of life: good    Pain  At best pain rating: 3  At worst pain ratin  Location: low back and R LE to foot  Relieving factors: medications  Aggravating factors: walking and lifting    Treatments  Previous treatment: physical therapy  Current treatment: physical therapy  Patient Goals  Patient goals for therapy: decreased pain, increased motion, increased strength, independence with ADLs/IADLs and return to sport/leisure activities          Objective     Postural Observations    Additional Postural Observation Details  Pt ambulates with % time before surgery and since surgery- still ambulating with SPC for household and community distances  Gait is with limp and SPC with decreased weightbearing R LE- Gait is improved with equal weightbearing B/L LE's, though slow chi noted    Pt performs bed mobility guarded with rolling technique - slow with sit to/from supine transfer  Pt guarded with sit to stand transfers performing transfer slowly- improving but still transfers slowly with use of SPC        Tenderness     Additional Tenderness Details  TTP R lumbar paraspinals at L4-5 level  - still present  TTP R pirfiromis- still present  No TTP L lumbar paraspinals or piriformis    Neurological Testing     Sensation     Lumbar   Left   Intact: light touch    Right   Intact: light touch    Additional Neurological Details  Pt reports intermittent burning R lateral thigh - persists    Active Range of Motion     Lumbar   Flexion: 22 degrees  with pain  Extension: 10 degrees  with pain  Left lateral flexion: 18 degrees    with pain  Right lateral flexion: 18 degrees     Additional Active Range of Motion Details  Bilateral knee and ankle ROM WFL  Seated hip flex L 102 deg R 100 deg  IR/ER PROM WFL  Unable to cross L over R due to low back pain- improved but still unable  Unable to cross R over L due to low back pain- improved but still unable    SLR: R- 67 deg L 68 deg  Piriformis: unable to obtain piriformis stretch due to back pain with hip flexion at 90 degrees- now able to tolerate piriformis stretching    Strength/Myotome Testing     Additional Strength Details  Bilateral hip flex  R 4+/5 L 4+/5                      abd  R 5/5 L 5/5                      Add  R 4+/5 L 5/5  Knee  Ext  R 4/5  L 4+/5             Flex  R 4+/5 L 4+/5  Ankle   Df    R 4+/5 L 4+/5              PF   R 4+/5 L 4+/5    Abdominal muscle: 4-/5  Lumbar parapsinals: 3+/5    Ambulation   Weight-Bearing Status   Assistive device used: single point cane    Additional Weight-Bearing Status Details  SPC for community distances, at times without AD in the house  - persists     Observational Gait   Decreased walking speed       General Comments:      Lumbar Comments  Sitting tolerance 25 minutes with increased pain- same  Walking tolerance 15 minutes with increased pain- same  Difficulty performing bending- pt notes a little better but still difficult  Difficulty with lower body dressing due to pain- pt reports improvement in donning pants  Ascends descends stairs with step to pattern due to back- pt reports improvement in stair negotiation  Foto score: 28- increased to 30 - increased to 36 - decreased to 26                               Precautions: 10/22/2019 L3-S1 fusion     Date 1/18 12/20 12/23 12/28 12/30   Visit 21 17 18 19 20   Pain  5/10 5/10 5 4   Manuals        Manual calf, hamstring, piriformis str ML ML ML KW KW   Neuro Re-Ed        PPT 30x 3" 30x 3" 30x 3" 30x 3" 30x 3"    PPT w/ march 3# 2/20 3# 2/20 3#  2/20 3# 2/20 3#    PPT with SLR 3# 2/15 3# 2/15 3# 2/15 3# 2/15 3# 2/15   PPT with opp arm/leg        Wall sags        Seated nerve glides Bilaterally 10x 5" Braxton x10 ea 5" hold 10x 5" B 10x 5" B  10x 5" B    Standing bilateral hip flex ext abd        T-band NASRIN/ROW L5 2/15 ea L5 2/15 L5 2/15 L5 2/15 L5 2/15   Ther Ex        LTR 3# 2/20 3# 2/20 3# 2/20 3# 2/20 3# 2/20   SAQ 3# 2/15   3# 2/15 3# 2/15   Clamshells L5 2/15 L5 2/15 L5 2/15 L5 2/15 L5 2/15    Adductor squeeze 30x 3" 30x 3" 30x 3" 30x 3" 30x 3"    LAQ's 3# 2/15 3# 2/15 3# 2/15 3# 2/15 3# 2/15   T-band hs curls L5 2/15 L5 2/15 L5 2/15 L5 2/15 L5 2/15   Nu-Step L4 10 min L4 10 mins L4 10 mins 10m L4 L4 10 mins   Ther Activity        Gait Training        Modalities        HP w/ IFC low back 15 min seated 15 mins seated 15 mins seated  15 min seated  15 min seated

## 2022-01-18 ENCOUNTER — EVALUATION (OUTPATIENT)
Dept: PHYSICAL THERAPY | Facility: CLINIC | Age: 52
End: 2022-01-18
Payer: OTHER MISCELLANEOUS

## 2022-01-18 DIAGNOSIS — Z98.1 S/P LUMBAR FUSION: Primary | ICD-10-CM

## 2022-01-18 DIAGNOSIS — M51.36 DEGENERATIVE DISC DISEASE, LUMBAR: ICD-10-CM

## 2022-01-18 DIAGNOSIS — M54.50 CHRONIC BILATERAL LOW BACK PAIN, UNSPECIFIED WHETHER SCIATICA PRESENT: ICD-10-CM

## 2022-01-18 DIAGNOSIS — G89.29 CHRONIC BILATERAL LOW BACK PAIN, UNSPECIFIED WHETHER SCIATICA PRESENT: ICD-10-CM

## 2022-01-18 PROCEDURE — 97014 ELECTRIC STIMULATION THERAPY: CPT | Performed by: PHYSICAL THERAPIST

## 2022-01-18 PROCEDURE — 97112 NEUROMUSCULAR REEDUCATION: CPT | Performed by: PHYSICAL THERAPIST

## 2022-01-18 PROCEDURE — 97110 THERAPEUTIC EXERCISES: CPT | Performed by: PHYSICAL THERAPIST

## 2022-01-18 PROCEDURE — 97140 MANUAL THERAPY 1/> REGIONS: CPT | Performed by: PHYSICAL THERAPIST

## 2022-01-18 NOTE — LETTER
2022    Davida Palacios PA-C  [address] [facility]    Patient: Daniel Rivera   YOB: 1970   Date of Visit: 2022     Encounter Diagnosis     ICD-10-CM    1  S/P lumbar fusion  Z98 1    2  Degenerative disc disease, lumbar  M51 36    3  Chronic bilateral low back pain, unspecified whether sciatica present  M54 50     G89 29        Dear Dr Marcial Lopez: Thank you for your recent referral of Daniel Rivera  Please review the attached evaluation summary from Sherwin's recent visit  Please verify that you agree with the plan of care by signing the attached order  If you have any questions or concerns, please do not hesitate to call  I sincerely appreciate the opportunity to share in the care of one of your patients and hope to have another opportunity to work with you in the near future  Sincerely,    Polina Gong, PT      Referring Provider:      I certify that I have read the below Plan of Care and certify the need for these services furnished under this plan of treatment while under my care  Davida Palacios PA-C  [address] [facility] [address]  Via Fax: [phone]          PT Re-Evaluation     Today's date: 2022  Patient name: Daniel Rivera  : 1970  MRN: [id number]  Referring provider: Susu Ramírez*  Dx:   Encounter Diagnosis     ICD-10-CM    1  S/P lumbar fusion  Z98 1    2  Degenerative disc disease, lumbar  M51 36    3  Chronic bilateral low back pain, unspecified whether sciatica present  M54 50     G89 29                   Assessment  Assessment details: The patient has been seen in PT for a total of 21 visits since [facility], today is his first visit since 21 secondary to having COVID    He has only been seen for a total of 4 visits since his last re-eval   He has complaints of constant pain along lower back and down RLE, notes increased pain since he has been here for his last treatment  He demonstrates deficits with decreased L/S ROM and strength, decreased flexibility, decreased postural awareness, difficulty sleeping and pain and difficulty with completing his ADLs and tasks at home  He still has pain with bending forward, family will assist with shoes and socks  He ambulates with [facility] for household and community distances with improved gait mechanics though slow chi noted  He is slow with transitions, such as sit to stand and supine to/from seated position  Secondary to surgery and above deficits he remains limited with his overall mobility and function  The patient may benefit from continued PT to address deficits and improve function  Tx to include ROM, stretching, strengthening, modalities, HEP, pt education, postural ed, lifting/body mechanics, neuro re-ed, balance/proprioception Te, MT and equipment  Plan to continue with PT and will progress as able in upcoming visits with ROM, stretching, strengthening, flexibility, postural awareness and HEP  Impairments: abnormal gait, abnormal muscle tone, abnormal or restricted ROM, activity intolerance, impaired physical strength and pain with function    Symptom irritability: moderateUnderstanding of Dx/Px/POC: good   Prognosis: good  Prognosis details: Pt is Greek speaking, spouse present to translate    Goals  STG's to be met in 3-4 weeks:  1  Decrease low back pain and R radicular symptoms by 25%- progressing  2  Increase bilateral L-spine pain by at least 5 degrees and hs flexibility by 5 degrees- met  3  Pt education for proper posture and pelvic positioning during functional activity- ongoing  4  Pt will ambulate with normal gait pattern with equal weightbearing with SPC- partially met  5  Abolish TTP R lumbar paraspinals and piriformis- not met    LTG's to be met by discharge:   1  Decreased pain to less than 2/10 with activity- not met  2   Maximize L-spine ROM in all planes and increase SLR bilaterally to 65 degrees- partially met  3  Increase core strength to 4+/5 and LE strength to 5/5 needed for (I) ADL's- not met  4  Increase standing and walking tolerance to at least 45 minutes before increase in pain- not met  5  Improve ability to perform lower body dressing without limitation- progressing  6  Increase Foto score to at least 45- progressing  7  Pt will be (I) with HEP- ongoing and progressing    Plan  Plan details: Modalities and therapy interventions prn  Patient would benefit from: skilled physical therapy  Planned modality interventions: thermotherapy: hydrocollator packs and unattended electrical stimulation  Other planned modality interventions: modalities to be added or modified at therapist discretion  Planned therapy interventions: abdominal trunk stabilization, manual therapy, neuromuscular re-education, patient education, postural training, therapeutic activities, strengthening, stretching, therapeutic exercise, home exercise program, flexibility, balance, gait training and self care  Frequency: 2x week  Duration in weeks: 4  Plan of Care beginning date: 1/18/2022  Plan of Care expiration date: 2/15/2022  Treatment plan discussed with: patient and family        Subjective Evaluation    History of Present Illness  Date of surgery: 10/22/2019  Mechanism of injury: surgery  Mechanism of injury: Pt reports overall since beginning therapy he is a little better but he still has days where he has more pain than other days  UPDATE 12/17/21:  The patient states that therapy has been helping  He still has good days and bad days with regards to pain  He will be going back to see the doctor in October 2022 for his next appointment  UPDATE 1/18/22: The patient's wife present t/o treatment and re-eval   Patient notes increased pain in lower back and into RLE since he was here last for treatment  He has not been in PT since 12/30/21 secondary to having COVID    He now presents back for treatment  His follow up appointment with the doctor is in 2022              Not a recurrent problem   Quality of life: good    Pain  At best pain rating: 3  At worst pain ratin  Location: low back and R LE to foot  Relieving factors: medications  Aggravating factors: walking and lifting    Treatments  Previous treatment: physical therapy  Current treatment: physical therapy  Patient Goals  Patient goals for therapy: decreased pain, increased motion, increased strength, independence with ADLs/IADLs and return to sport/leisure activities          Objective     Postural Observations    Additional Postural Observation Details  Pt ambulates with % time before surgery and since surgery- still ambulating with SPC for household and community distances  Gait is with limp and SPC with decreased weightbearing R LE- Gait is improved with equal weightbearing B/L LE's, though slow chi noted    Pt performs bed mobility guarded with rolling technique - slow with sit to/from supine transfer  Pt guarded with sit to stand transfers performing transfer slowly- improving but still transfers slowly with use of SPC        Tenderness     Additional Tenderness Details  TTP R lumbar paraspinals at L4-5 level  - still present  TTP R pirfiromis- still present  No TTP L lumbar paraspinals or piriformis    Neurological Testing     Sensation     Lumbar   Left   Intact: light touch    Right   Intact: light touch    Additional Neurological Details  Pt reports intermittent burning R lateral thigh - persists    Active Range of Motion     Lumbar   Flexion: 22 degrees  with pain  Extension: 10 degrees  with pain  Left lateral flexion: 18 degrees    with pain  Right lateral flexion: 18 degrees     Additional Active Range of Motion Details  Bilateral knee and ankle ROM WFL  Seated hip flex L 102 deg R 100 deg  IR/ER PROM WFL  Unable to cross L over R due to low back pain- improved but still unable  Unable to cross R over L due to low back pain- improved but still unable    SLR: R- 67 deg L 68 deg  Piriformis: unable to obtain piriformis stretch due to back pain with hip flexion at 90 degrees- now able to tolerate piriformis stretching    Strength/Myotome Testing     Additional Strength Details  Bilateral hip flex  R 4+/5 L 4+/5                      abd  R 5/5 L 5/5                      Add  R 4+/5 L 5/5  Knee  Ext  R 4/5  L 4+/5             Flex  R 4+/5 L 4+/5  Ankle   Df    R 4+/5 L 4+/5              PF   R 4+/5 L 4+/5    Abdominal muscle: 4-/5  Lumbar parapsinals: 3+/5    Ambulation   Weight-Bearing Status   Assistive device used: single point cane    Additional Weight-Bearing Status Details  SPC for community distances, at times without AD in the house  - persists     Observational Gait   Decreased walking speed       General Comments:      Lumbar Comments  Sitting tolerance 25 minutes with increased pain- same  Walking tolerance 15 minutes with increased pain- same  Difficulty performing bending- pt notes a little better but still difficult  Difficulty with lower body dressing due to pain- pt reports improvement in donning pants  Ascends descends stairs with step to pattern due to back- pt reports improvement in stair negotiation  Foto score: 28- increased to 30 - increased to 36 - decreased to 26                               Precautions: 10/22/2019 L3-S1 fusion     Date 1/18 12/20 12/23 12/28 12/30   Visit 21 17 18 19 20   Pain  5/10 5/10 5 4   Manuals        Manual calf, hamstring, piriformis str ML ML ML KW KW   Neuro Re-Ed        PPT 30x 3" 30x 3" 30x 3" 30x 3" 30x 3"    PPT w/ march 3# 2/20 3# 2/20 3#  2/20 3# 2/20 3#    PPT with SLR 3# 2/15 3# 2/15 3# 2/15 3# 2/15 3# 2/15   PPT with opp arm/leg        Wall sags        Seated nerve glides Bilaterally 10x 5" Braxton x10 ea 5" hold 10x 5" B 10x 5" B  10x 5" B    Standing bilateral hip flex ext abd        T-band NASRIN/ROW L5 2/15 ea L5 2/15 L5 2/15 L5 2/15 L5 2/15   Ther Ex        LTR 3# 2/20 3# 2/20 3# 2/20 3# 2/20 3# 2/20   SAQ 3# 2/15   3# 2/15 3# 2/15   Clamshells L5 2/15 L5 2/15 L5 2/15 L5 2/15 L5 2/15    Adductor squeeze 30x 3" 30x 3" 30x 3" 30x 3" 30x 3"    LAQ's 3# 2/15 3# 2/15 3# 2/15 3# 2/15 3# 2/15   T-band hs curls L5 2/15 L5 2/15 L5 2/15 L5 2/15 L5 2/15   Nu-Step L4 10 min L4 10 mins L4 10 mins 10m L4 L4 10 mins   Ther Activity        Gait Training        Modalities        HP w/ IFC low back 15 min seated 15 mins seated 15 mins seated  15 min seated  15 min seated

## 2022-01-20 ENCOUNTER — OFFICE VISIT (OUTPATIENT)
Dept: PHYSICAL THERAPY | Facility: CLINIC | Age: 52
End: 2022-01-20
Payer: OTHER MISCELLANEOUS

## 2022-01-20 DIAGNOSIS — M51.36 DEGENERATIVE DISC DISEASE, LUMBAR: ICD-10-CM

## 2022-01-20 DIAGNOSIS — G89.29 CHRONIC BILATERAL LOW BACK PAIN, UNSPECIFIED WHETHER SCIATICA PRESENT: ICD-10-CM

## 2022-01-20 DIAGNOSIS — M54.50 CHRONIC BILATERAL LOW BACK PAIN, UNSPECIFIED WHETHER SCIATICA PRESENT: ICD-10-CM

## 2022-01-20 DIAGNOSIS — Z98.1 S/P LUMBAR FUSION: Primary | ICD-10-CM

## 2022-01-20 PROCEDURE — 97140 MANUAL THERAPY 1/> REGIONS: CPT

## 2022-01-20 PROCEDURE — 97112 NEUROMUSCULAR REEDUCATION: CPT

## 2022-01-20 PROCEDURE — 97110 THERAPEUTIC EXERCISES: CPT

## 2022-01-20 PROCEDURE — 97010 HOT OR COLD PACKS THERAPY: CPT

## 2022-01-20 PROCEDURE — 97014 ELECTRIC STIMULATION THERAPY: CPT

## 2022-01-20 NOTE — PROGRESS NOTES
Daily Note     Today's date: 2022  Patient name: Matty Cruz  : 1970  MRN: [id number]  Referring provider: Amy Calzada*  Dx:   Encounter Diagnosis     ICD-10-CM    1  S/P lumbar fusion  Z98 1    2  Degenerative disc disease, lumbar  M51 36    3  Chronic bilateral low back pain, unspecified whether sciatica present  M54 50     G89 29                   Subjective: Patient notes that pain does persist in the L/S and RLE radicular pain symptoms  SPR is 5/10 this date  Objective: See treatment diary below      Assessment: Tolerated treatment well  Patient would benefit from continued PT to increase strength of the core musculature increase flexibility of the BLE and trunk as well as decrease pain symptoms  Patient was able to progress through stabilization exercise with no increased pain complaints  Plan: Continue per plan of care                          Precautions: 10/22/2019 L3-S1 fusion     Date    Visit 21 22 18 19 20   Pain  10 5/10 5 4   Manuals        Manual calf, hamstring, piriformis str ML CD ML KW KW   Neuro Re-Ed        PPT 30x 3" 30x 3" 30x 3" 30x 3" 30x 3"    PPT w/ march 3#  3#  3#   3#  3#    PPT with SLR 3# 2/15 3# 2/15 3# 2/15 3# 2/15 3# 2/15   PPT with opp arm/leg        Wall sags        Seated nerve glides Bilaterally 10x 5" Braxton x10 ea 5" hold 10x 5" B 10x 5" B  10x 5" B    Standing bilateral hip flex ext abd        T-band NASRIN/ROW L5 2/15 ea L5 2/15 L5 2/15 L5 2/15 L5 2/15   Ther Ex        LTR 3#  3#  3#  3#  3#    SAQ 3# 2/15 3# 2/15  3# 2/15 3# 2/15   Clamshells L5 2/15 L5 2/15 L5 2/15 L5 2/15 L5 2/15    Adductor squeeze 30x 3" 30x 3" 30x 3" 30x 3" 30x 3"    LAQ's 3# 2/15 3# 2/15 3# 2/15 3# 2/15 3# 2/15   T-band hs curls L5 2/15 L5 2/15 L5 2/15 L5 2/15 L5 2/15   Nu-Step L4 10 min L4 10 mins L4 10 mins 10m L4 L4 10 mins   Ther Activity        Gait Training        Modalities        HP w/ IFC low back 15 min seated 15 mins seated 15 mins seated  15 min seated  15 min seated

## 2022-01-24 ENCOUNTER — OFFICE VISIT (OUTPATIENT)
Dept: PHYSICAL THERAPY | Facility: CLINIC | Age: 52
End: 2022-01-24
Payer: OTHER MISCELLANEOUS

## 2022-01-24 DIAGNOSIS — G89.29 CHRONIC BILATERAL LOW BACK PAIN, UNSPECIFIED WHETHER SCIATICA PRESENT: ICD-10-CM

## 2022-01-24 DIAGNOSIS — Z98.1 S/P LUMBAR FUSION: Primary | ICD-10-CM

## 2022-01-24 DIAGNOSIS — M51.36 DEGENERATIVE DISC DISEASE, LUMBAR: ICD-10-CM

## 2022-01-24 DIAGNOSIS — M54.50 CHRONIC BILATERAL LOW BACK PAIN, UNSPECIFIED WHETHER SCIATICA PRESENT: ICD-10-CM

## 2022-01-24 PROCEDURE — 97140 MANUAL THERAPY 1/> REGIONS: CPT

## 2022-01-24 PROCEDURE — 97014 ELECTRIC STIMULATION THERAPY: CPT

## 2022-01-24 PROCEDURE — 97112 NEUROMUSCULAR REEDUCATION: CPT

## 2022-01-24 PROCEDURE — 97010 HOT OR COLD PACKS THERAPY: CPT

## 2022-01-24 PROCEDURE — 97110 THERAPEUTIC EXERCISES: CPT

## 2022-01-24 NOTE — PROGRESS NOTES
Daily Note     Today's date: 2022  Patient name: Carmine Stanford  : 1970  MRN: [id number]  Referring provider: Apoorva Rebollar*  Dx:   Encounter Diagnosis     ICD-10-CM    1  S/P lumbar fusion  Z98 1    2  Degenerative disc disease, lumbar  M51 36    3  Chronic bilateral low back pain, unspecified whether sciatica present  M54 50     G89 29                   Subjective: Patient notes pain at 4/10 SPR at this time  He notes symptoms remain in the right buttock and radiate to the lower lateral leg  Objective: See treatment diary below      Assessment: Tolerated treatment well  Patient would benefit from continued PT to progress core stabilization program and work to decrease remaining radicular symptoms in the RLE  Patient was able to progress to L5 on the Nu-Step this date  Plan: Continue per plan of care                          Precautions: 10/22/2019 L3-S1 fusion     Date    Visit 21 22 23 19 20   Pain  5/10 4/10 5 4   Manuals        Manual calf, hamstring, piriformis str ML CD CD- added SL neural glide KW KW   Neuro Re-Ed        PPT 30x 3" 30x 3" 30x 3" 30x 3" 30x 3"    PPT / march 3#  3#  3#   3#  3#    PPT with SLR 3# 2/15 3# 2/15 3# 2/15 3# 2/15 3# 2/15   PPT with opp arm/leg        Wall sags        Seated nerve glides Bilaterally 10x 5" Braxton x10 ea 5" hold 10x 5" B 10x 5" B  10x 5" B    Standing bilateral hip flex ext abd        T-band NASRIN/ROW L5 2/15 ea L5 2/15 L5 2/15 L5 2/15 L5 2/15   Ther Ex        LTR 3#  3#  3#  3#  3#    SAQ 3# 2/15 3# 2/15 3# 2/15 3# 2/15 3# 2/15   Clamshells L5 2/15 L5 2/15 L5 2/15 L5 2/15 L5 2/15    Adductor squeeze 30x 3" 30x 3" 30x 3" 30x 3" 30x 3"    LAQ's 3# 2/15 3# 2/15 3# 2/15 3# 2/15 3# 2/15   T-band hs curls L5 2/15 L5 /15 L5 2/15 L5 2/15 L5 2/15   Nu-Step L4 10 min L4 10 mins L5 10 mins 10m L4 L4 10 mins   Ther Activity        Gait Training        Modalities        HP w/ IFC low back 15 min seated 15 mins seated 15 mins seated  15 min seated  15 min seated

## 2022-01-27 ENCOUNTER — OFFICE VISIT (OUTPATIENT)
Dept: PHYSICAL THERAPY | Facility: CLINIC | Age: 52
End: 2022-01-27
Payer: OTHER MISCELLANEOUS

## 2022-01-27 DIAGNOSIS — G89.29 CHRONIC BILATERAL LOW BACK PAIN, UNSPECIFIED WHETHER SCIATICA PRESENT: ICD-10-CM

## 2022-01-27 DIAGNOSIS — Z98.1 S/P LUMBAR FUSION: Primary | ICD-10-CM

## 2022-01-27 DIAGNOSIS — M51.36 DEGENERATIVE DISC DISEASE, LUMBAR: ICD-10-CM

## 2022-01-27 DIAGNOSIS — M54.50 CHRONIC BILATERAL LOW BACK PAIN, UNSPECIFIED WHETHER SCIATICA PRESENT: ICD-10-CM

## 2022-01-27 PROCEDURE — 97112 NEUROMUSCULAR REEDUCATION: CPT

## 2022-01-27 PROCEDURE — 97140 MANUAL THERAPY 1/> REGIONS: CPT

## 2022-01-27 PROCEDURE — 97014 ELECTRIC STIMULATION THERAPY: CPT

## 2022-01-27 PROCEDURE — 97110 THERAPEUTIC EXERCISES: CPT

## 2022-01-27 NOTE — PROGRESS NOTES
Daily Note     Today's date: 2022  Patient name: Micha Birch  : 1970  MRN: [id number]  Referring provider: Leonidas Alcala*  Dx:   Encounter Diagnosis     ICD-10-CM    1  S/P lumbar fusion  Z98 1    2  Degenerative disc disease, lumbar  M51 36    3  Chronic bilateral low back pain, unspecified whether sciatica present  M54 50     G89 29        Start Time: 0930  Stop Time: 1045  Total time in clinic (min): 75 minutes    Subjective: Pt notes continued L L/S discomfort; slightly more today      Objective: See treatment diary below  PTA reviewed seated nerve glide(LE) for HEP he will complete same 2x daily  Assessment: Tolerated treatment well  Patient exhibited good technique with therapeutic exercises  Increased mobility after tx as commented by the patient  Plan: Continue per plan of care                          Precautions: 10/22/2019 L3-S1 fusion     Date    Visit 21 22 23 24 20   Pain  5/10 4/10 5/10 4   Manuals        Manual calf, hamstring, piriformis str+ sl nerve glide ML CD CD- added SL neural glide ds KW   Neuro Re-Ed        PPT 30x 3" 30x 3" 30x 3" 30x 3" 30x 3"    PPT / march 3#  3#  3#   3#  3#    PPT with SLR 3# 2/15 3# 2/15 3# 2/15 3# 2/15 3# 2/15   PPT with opp arm/leg        Wall sags        Seated nerve glides Bilaterally 10x 5" Braxton x10 ea 5" hold 10x 5" B 10x 5" B  10x 5" B    Standing bilateral hip flex ext abd        T-band NASRIN/ROW L5 2/15 ea L5 2/15 L5 2/15 L5 2/15 L5 2/15   Ther Ex        LTR 3#  3#  3#  3#  3#    SAQ 3# 2/15 3# 2/15 3# 2/15 3# 2/15 3# 2/15   Clamshells L5 2/15 L5 2/15 L5 2/15 L5 2/15 L5 2/15    Adductor squeeze 30x 3" 30x 3" 30x 3" 30x 3" 30x 3"    LAQ's 3# 2/15 3# 2/15 3# 2/15 3# 2/15 3# 2/15   T-band hs curls L5 2/15 L5 2/15 L5 2/15 L5 2/15 L5 2/15   Nu-Step L4 10 min L4 10 mins L5 10 mins 12m L4 L4 10 mins   Ther Activity        Gait Training        Modalities        HP/ IFC low back, seated 15 min seated 15 mins seated 15 mins seated  15m 15 min seated

## 2022-02-02 ENCOUNTER — OFFICE VISIT (OUTPATIENT)
Dept: PHYSICAL THERAPY | Facility: CLINIC | Age: 52
End: 2022-02-02
Payer: OTHER MISCELLANEOUS

## 2022-02-02 DIAGNOSIS — G89.29 CHRONIC BILATERAL LOW BACK PAIN, UNSPECIFIED WHETHER SCIATICA PRESENT: ICD-10-CM

## 2022-02-02 DIAGNOSIS — M54.50 CHRONIC BILATERAL LOW BACK PAIN, UNSPECIFIED WHETHER SCIATICA PRESENT: ICD-10-CM

## 2022-02-02 DIAGNOSIS — Z98.1 S/P LUMBAR FUSION: Primary | ICD-10-CM

## 2022-02-02 DIAGNOSIS — M51.36 DEGENERATIVE DISC DISEASE, LUMBAR: ICD-10-CM

## 2022-02-02 PROCEDURE — 97110 THERAPEUTIC EXERCISES: CPT

## 2022-02-02 PROCEDURE — 97140 MANUAL THERAPY 1/> REGIONS: CPT

## 2022-02-02 PROCEDURE — 97112 NEUROMUSCULAR REEDUCATION: CPT

## 2022-02-02 PROCEDURE — 97014 ELECTRIC STIMULATION THERAPY: CPT

## 2022-02-02 NOTE — PROGRESS NOTES
Daily Note     Today's date: 2022  Patient name: Elton Killian  : 1970  MRN: [id number]  Referring provider: Garrison Mae*  Dx:   Encounter Diagnosis     ICD-10-CM    1  S/P lumbar fusion  Z98 1    2  Degenerative disc disease, lumbar  M51 36    3  Chronic bilateral low back pain, unspecified whether sciatica present  M54 50     G89 29        Start Time: 1030  Stop Time: 1145  Total time in clinic (min): 75 minutes    Subjective: Pt continues to note R L/S discomfort  He states he performs his seated LE nerve glides at home   Objective: See treatment diary below  PTA progressed program per PT POC      Assessment: Tolerated treatment well  Patient exhibited good technique with therapeutic exercises  Pt notes LE fatigue after changes today  Plan: Continue per plan of care           Precautions: 10/22/2019 L3-S1 fusion     Date  2/   Visit  24 25   Pain  5/10 4/10 5/10 5   Manuals        Manual calf, hamstring, piriformis str+ sl nerve glide ML CD CD- added SL neural glide ds ds   Neuro Re-Ed        PPT 30x 3" 30x 3" 30x 3" 30x 3" 30x 3"    PPT / march 3#  3#  3#   3#  3#    PPT with SLR 3# 2/15 3# 2/15 3# 2/15 3# 2/15 3# 2/15   PPT with opp arm/leg        Wall sags        Seated nerve glides Bilaterally 10x 5" Braxton x10 ea 5" hold 10x 5" B 10x 5" B  10x 5"    Standing bilateral hip flex ext abd        Ther Ex        NASRIN/ROW L5 2/10 L5 2/10 L5 2/10 L5 2/10 30/40# 2/10   Triceps     30# 2/10   Lat pulldown     NV   LTR 3#  3#  3#  3#  3#    SAQ 3# 2/15 3# 2/15 3# 2/15 3# 2/15 DC   Clamshells L5 2/15 L5 2/15 L5 2/15 L5 2/15 L5 2/15    Adductor squeeze 30x 3" 30x 3" 30x 3" 30x 3" 30x 3"    LAQ's 3# 2/15 3# 2/15 3# 2/15 3# 2/15 20#  2/10   Hamstring curls L5 2/15 L5 2/15 L5 2/15 L5 2/ 35# 2/10   Nu-Step L4 10 min L4 10 mins L5 10 mins 12m L4 L5 12m    Ther Activity        Gait Training        Modalities        HP/ IFC low back, seated 15 min seated 15 mins seated 15 mins seated  15m 15m

## 2022-02-03 ENCOUNTER — OFFICE VISIT (OUTPATIENT)
Dept: PHYSICAL THERAPY | Facility: CLINIC | Age: 52
End: 2022-02-03
Payer: OTHER MISCELLANEOUS

## 2022-02-03 DIAGNOSIS — Z98.1 S/P LUMBAR FUSION: ICD-10-CM

## 2022-02-03 DIAGNOSIS — G89.29 CHRONIC BILATERAL LOW BACK PAIN, UNSPECIFIED WHETHER SCIATICA PRESENT: ICD-10-CM

## 2022-02-03 DIAGNOSIS — M51.36 DEGENERATIVE DISC DISEASE, LUMBAR: Primary | ICD-10-CM

## 2022-02-03 DIAGNOSIS — M54.50 CHRONIC BILATERAL LOW BACK PAIN, UNSPECIFIED WHETHER SCIATICA PRESENT: ICD-10-CM

## 2022-02-03 PROCEDURE — 97014 ELECTRIC STIMULATION THERAPY: CPT

## 2022-02-03 PROCEDURE — 97140 MANUAL THERAPY 1/> REGIONS: CPT

## 2022-02-03 PROCEDURE — 97112 NEUROMUSCULAR REEDUCATION: CPT

## 2022-02-03 PROCEDURE — 97110 THERAPEUTIC EXERCISES: CPT

## 2022-02-03 NOTE — PROGRESS NOTES
Daily Note     Today's date: 2/3/2022  Patient name: Bret Tyler  : 1970  MRN: [id number]  Referring provider: Noemi Rush*  Dx:   Encounter Diagnosis     ICD-10-CM    1  Degenerative disc disease, lumbar  M51 36    2  Chronic bilateral low back pain, unspecified whether sciatica present  M54 50     G89 29    3  S/P lumbar fusion  Z98 1        Start Time: 1030  Stop Time: 1145  Total time in clinic (min): 75 minutes    Subjective: Pt continues to report R L/S discomfort; some LE discomfort also today, absent radicular sx  Objective: See treatment diary below      Assessment: Tolerated treatment well  Patient exhibited good technique with therapeutic exercises      Plan: Continue per plan of care           Precautions: 10/22/2019 L3-S1 fusion     Date 2/3 1/20 1/24 1/27 2/2   Visit  24 25   Pain  5/10 4/10 5/10 5   Manuals        Manual calf, hamstring, piriformis str+ sl nerve glide ds CD CD- added SL neural glide ds ds   Neuro Re-Ed        PPT 30x 3" 30x 3" 30x 3" 30x 3" 30x 3"    PPT / march 3#  3#  3#   3#  3#    PPT with SLR 3# 2/15 3# 2/15 3# 2/15 3# 2/15 3# 2/15   PPT with opp arm/leg        Wall sags        Seated nerve glides Bilaterally home x10 ea 5" hold 10x 5" B 10x 5" B  10x 5"    Standing bilateral hip flex ext abd NV       Ther Ex        NASRIN/ROW 30/40 2/15 L5 2/10 L5 2/10 L5 2/10 30/40# 2/10   Triceps 30# 2/15    30# 2/10   Lat pulldown        LTR 3#  3# 2 3#  3#  3# 2   Clamshells L5 2/15 L5 2/15 L5 2/15 L5 2/15 L5 2/15    Adductor squeeze 30x 3" 30x 3" 30x 3" 30x 3" 30x 3"    LAQ's 20# 2/15 3# 2/15 3# 2/15 3# 2/15 20#  2/10   Hamstring curls 40# 2/15 L5 2/15 L5 2/15 L5 2/1 35# 2/10   Nu-Step 15m L5 L4 10 mins L5 10 mins 12m L4 L5 12m    Ther Activity        Gait Training        Modalities        HP/ IFC low back, seated 15m 15 mins seated 15 mins seated  15m 15m

## 2022-02-07 ENCOUNTER — OFFICE VISIT (OUTPATIENT)
Dept: PHYSICAL THERAPY | Facility: CLINIC | Age: 52
End: 2022-02-07
Payer: OTHER MISCELLANEOUS

## 2022-02-07 DIAGNOSIS — M54.50 CHRONIC BILATERAL LOW BACK PAIN, UNSPECIFIED WHETHER SCIATICA PRESENT: ICD-10-CM

## 2022-02-07 DIAGNOSIS — Z98.1 S/P LUMBAR FUSION: ICD-10-CM

## 2022-02-07 DIAGNOSIS — M51.36 DEGENERATIVE DISC DISEASE, LUMBAR: Primary | ICD-10-CM

## 2022-02-07 DIAGNOSIS — G89.29 CHRONIC BILATERAL LOW BACK PAIN, UNSPECIFIED WHETHER SCIATICA PRESENT: ICD-10-CM

## 2022-02-07 PROCEDURE — 97014 ELECTRIC STIMULATION THERAPY: CPT

## 2022-02-07 PROCEDURE — 97112 NEUROMUSCULAR REEDUCATION: CPT

## 2022-02-07 PROCEDURE — 97140 MANUAL THERAPY 1/> REGIONS: CPT

## 2022-02-07 PROCEDURE — 97110 THERAPEUTIC EXERCISES: CPT

## 2022-02-07 NOTE — PROGRESS NOTES
Daily Note     Today's date: 2022  Patient name: Sky Crawford  : 1970  MRN: [id number]  Referring provider: Kyaw Berry*  Dx:   Encounter Diagnosis     ICD-10-CM    1  Degenerative disc disease, lumbar  M51 36    2  Chronic bilateral low back pain, unspecified whether sciatica present  M54 50     G89 29    3  S/P lumbar fusion  Z98 1        Start Time: 1030  Stop Time: 1145  Total time in clinic (min): 75 minutes    Subjective: Pt notes no increased LBP  Objective: See treatment diary below  FOTO 30      Assessment: Tolerated treatment well  Patient exhibited good technique with therapeutic exercises      Plan: Continue per plan of care           Precautions: 10/22/2019 L3-S1 fusion     Date 2/3 2/7 1/24 1/27 2/2   Visit 26 27 23 24 25   Pain   4/10 510 5   Manuals        Manual calf, hamstring, piriformis str+ sl nerve glide ds ds CD- added SL neural glide ds ds   Neuro Re-Ed        PPT 30x 3" 30x 3" 30x 3" 30x 3" 30x 3"    PPT / march 3# 2 3# 2 3#  2 3# 2 3# 2   PPT with SLR 3# 2/15 3# 2/15 3# 2/15 3# 2/15 3# 2/15   PPT with opp arm/leg        Wall sags        Seated nerve glides Bilaterally home home 10x 5" B 10x 5" B  10x 5"    Standing bilateral hip flex ext abd        Ther Ex        NASRIN/ROW 30/40 2/15 30/40 2/15 L5 2/10 L5 2/10 30/40# 2/10   Triceps 30# 2/15 30# 2/15   30# 2/10   Lat pulldown  NV      LTR 3# 2 3# 2/ 3# 2 3# 2/ 3# 2/20   Clamshells L5 2/15 L5 2/15 L5 2/15 L5 2/15 L5 2/15    Adductor squeeze 30x 3" 30x 3" 30x 3" 30x 3" 30x 3"    LAQ's 20# 2/15 20# 2/15 3# 2/15 3# 2/15 20#  2/10   Hamstring curls 40# 215 40#  2/15 L5 2/15 L5 2/1 35# 2/10   Nu-Step 15m L5 15m L5 L5 10 mins 12m L4 L5 12m    Ther Activity        Gait Training        Modalities        HP/ IFC low back, seated 15m 15m 15m 15m 15m

## 2022-02-11 ENCOUNTER — OFFICE VISIT (OUTPATIENT)
Dept: PHYSICAL THERAPY | Facility: CLINIC | Age: 52
End: 2022-02-11
Payer: OTHER MISCELLANEOUS

## 2022-02-11 DIAGNOSIS — Z98.1 S/P LUMBAR FUSION: ICD-10-CM

## 2022-02-11 DIAGNOSIS — M51.36 DEGENERATIVE DISC DISEASE, LUMBAR: Primary | ICD-10-CM

## 2022-02-11 DIAGNOSIS — M54.50 CHRONIC BILATERAL LOW BACK PAIN, UNSPECIFIED WHETHER SCIATICA PRESENT: ICD-10-CM

## 2022-02-11 DIAGNOSIS — G89.29 CHRONIC BILATERAL LOW BACK PAIN, UNSPECIFIED WHETHER SCIATICA PRESENT: ICD-10-CM

## 2022-02-11 PROCEDURE — 97112 NEUROMUSCULAR REEDUCATION: CPT

## 2022-02-11 PROCEDURE — 97014 ELECTRIC STIMULATION THERAPY: CPT

## 2022-02-11 PROCEDURE — 97110 THERAPEUTIC EXERCISES: CPT

## 2022-02-11 PROCEDURE — 97140 MANUAL THERAPY 1/> REGIONS: CPT

## 2022-02-11 NOTE — PROGRESS NOTES
Daily Note     Today's date: 2022  Patient name: Carmine Stanford  : 1970  MRN: [id number]  Referring provider: Apoorva Rebollar*  Dx:   Encounter Diagnosis     ICD-10-CM    1  Degenerative disc disease, lumbar  M51 36    2  Chronic bilateral low back pain, unspecified whether sciatica present  M54 50     G89 29    3  S/P lumbar fusion  Z98 1        Start Time: 1000  Stop Time: 1115  Total time in clinic (min): 75 minutes    Subjective: Pt notes his LBP is slightly better  Objective: See treatment diary below  Added stranding lat pulldown for increased core stability  Assessment: Tolerated treatment well  Patient exhibited good technique with therapeutic exercises      Plan: Continue per plan of care           Precautions: 10/22/2019 L3-S1 fusion     Date 2/3 2/7 2/11 1/27 2/2   Visit 26 27 28 24 25   Pain    /10 5   Manuals        Manual calf, hamstring, piriformis str+ sl nerve glide ds ds ds ds ds   Neuro Re-Ed        PPT 30x 3" 30x 3" 30x 3" 30x 3" 30x 3"    PPT / march 3#  3#  3#   3#  3#    PPT with SLR 3# 2/15 3# 2/15 3# 2/15 3# 2/15 3# 2/15   PPT with opp arm/leg        Wall sags        Seated nerve glides Bilaterally home home home 10x 5" B  10x 5"    Standing bilateral hip flex ext abd        Ther Ex        NASRIN/ROW 30/40 2/15 30/40 2/15 50# 2/15 L5 2/10 30/40# 2/10   Triceps 30# 2/15 30# 2/15 50# 2/15  30# 2/10   Lat pulldown  NV 30# 2/10     LTR 3# 2 3# 2 3# 2 3#  3# 2   Supine hip ABD L5 2/15 L5 2/15 L5 2/15 L5 2/15 L5 2/15    Adductor squeeze 30x 3" 30x 3" 30x 3" 30x 3" 30x 3"    LAQ's 20# 2/15 20# 2/15 30# 2/15 3# 2/15 20#  2/10   Hamstring curls 40# 2/15 40#  2/15 50#  2/15 L5 2/1 35# 2/10   Nu-Step 15m L5 15m L5 15m L5 12m L4 L5 12m    Ther Activity        Gait Training        Modalities        HP/ IFC low back, seated 15m 15m 15m 15m 15m

## 2022-02-16 ENCOUNTER — EVALUATION (OUTPATIENT)
Dept: PHYSICAL THERAPY | Facility: CLINIC | Age: 52
End: 2022-02-16
Payer: OTHER MISCELLANEOUS

## 2022-02-16 DIAGNOSIS — Z98.1 S/P LUMBAR FUSION: ICD-10-CM

## 2022-02-16 DIAGNOSIS — M51.36 DEGENERATIVE DISC DISEASE, LUMBAR: Primary | ICD-10-CM

## 2022-02-16 DIAGNOSIS — M54.50 CHRONIC BILATERAL LOW BACK PAIN, UNSPECIFIED WHETHER SCIATICA PRESENT: ICD-10-CM

## 2022-02-16 DIAGNOSIS — G89.29 CHRONIC BILATERAL LOW BACK PAIN, UNSPECIFIED WHETHER SCIATICA PRESENT: ICD-10-CM

## 2022-02-16 PROCEDURE — 97112 NEUROMUSCULAR REEDUCATION: CPT

## 2022-02-16 PROCEDURE — 97140 MANUAL THERAPY 1/> REGIONS: CPT

## 2022-02-16 PROCEDURE — 97014 ELECTRIC STIMULATION THERAPY: CPT

## 2022-02-16 PROCEDURE — 97110 THERAPEUTIC EXERCISES: CPT

## 2022-02-16 PROCEDURE — 97010 HOT OR COLD PACKS THERAPY: CPT

## 2022-02-16 NOTE — PROGRESS NOTES
PT Re-Evaluation     Today's date: 2022  Patient name: Marshell Dakin  : 1970  MRN: [id number]  Referring provider: Pippa Perkins*  Dx:   Encounter Diagnosis     ICD-10-CM    1  Degenerative disc disease, lumbar  M51 36    2  Chronic bilateral low back pain, unspecified whether sciatica present  M54 50     G89 29    3  S/P lumbar fusion  Z98 1        Start Time: 1000  Stop Time: 1115  Total time in clinic (min): 75 minutes    Assessment  Assessment details: The patient has been seen in PT for a total of 29 visits since [facility]  He has complaints of constant pain along lower back and down RLE, notes increased pain since he has been here for his last treatment, however feels he is doing better overall with improved ability to complete tranfers and functional mobility  He demonstrates deficits with decreased L/S ROM and strength, decreased flexibility, decreased postural awareness, difficulty sleeping  He still has pain with bending forward however no longer need assist with putting on his socks and shoes  He ambulates with [facility] for household and community distances with improved gait mechanics though slow chi noted  He has been able to progress to increased strengthening exercise for postural musculature as well as BLE strength and core stabilization exercise  The patient may benefit from continued PT to address deficits and improve function  Tx to include ROM, stretching, strengthening, modalities, HEP, pt education, postural ed, lifting/body mechanics, neuro re-ed, balance/proprioception Te, MT and equipment  Plan to continue with PT and will progress as able in upcoming visits with ROM, stretching, strengthening, flexibility, postural awareness and HEP        Impairments: abnormal gait, abnormal muscle tone, abnormal or restricted ROM, activity intolerance, impaired physical strength and pain with function    Symptom irritability: moderateUnderstanding of Dx/Px/POC: good Prognosis: good  Prognosis details: Pt is [place] speaking, spouse present to translate    Goals  STG's to be met in 3-4 weeks:  1  Decrease low back pain and R radicular symptoms by 25%- progressing  2  Increase bilateral L-spine pain by at least 5 degrees and hs flexibility by 5 degrees- met  3  Pt education for proper posture and pelvic positioning during functional activity- met  4  Pt will ambulate with normal gait pattern with equal weightbearing with SPC- partially met - slow chi   5  Abolish TTP R lumbar paraspinals and piriformis- not met    LTG's to be met by discharge:   1  Decreased pain to less than 2/10 with activity- not met  2  Maximize L-spine ROM in all planes and increase SLR bilaterally to 65 degrees- partially met  3  Increase core strength to 4+/5 and LE strength to 5/5 needed for (I) ADL's-  Progressing core at 4/5  4  Increase standing and walking tolerance to at least 45 minutes before increase in pain- progress to 30 minutes and at times longer  5  Improve ability to perform lower body dressing without limitation- progressing  6  Increase Foto score to at least 45- progressing - recent FOTO 30  7  Pt will be (I) with HEP- ongoing and progressing    Plan  Plan details: Modalities and therapy interventions prn      Patient would benefit from: skilled physical therapy  Planned modality interventions: thermotherapy: hydrocollator packs and unattended electrical stimulation  Other planned modality interventions: modalities to be added or modified at therapist discretion  Planned therapy interventions: abdominal trunk stabilization, manual therapy, neuromuscular re-education, patient education, postural training, therapeutic activities, strengthening, stretching, therapeutic exercise, home exercise program, flexibility, balance, gait training and self care  Frequency: 2x week  Duration in weeks: 4  Plan of Care beginning date: 2/16/2022  Plan of Care expiration date: 3/18/2022  Treatment plan discussed with: patient and family        Subjective Evaluation    History of Present Illness  Date of surgery: 10/22/2019  Mechanism of injury: surgery  Mechanism of injury: Pt reports overall since beginning therapy he is a little better but he still has days where he has more pain than other days  UPDATE 21:  The patient states that therapy has been helping  He still has good days and bad days with regards to pain  He will be going back to see the doctor in 2022 for his next appointment  UPDATE 22: The patient's wife present t/o treatment and re-eval   Patient notes increased pain in lower back and into RLE since he was here last for treatment  He has not been in PT since 21 secondary to having COVID  He now presents back for treatment  His follow up appointment with the doctor is in 2022  UPDATE 22:   Patient presents for treatment with his wife  He notes that pain continues to radiate to the RLE into the foot  He notes that pain is increased this date for unknown reason  He has been able to attend therapy regularly this month             Not a recurrent problem   Quality of life: good    Pain  At best pain rating: 3  At worst pain ratin  Location: low back and R LE to foot  Relieving factors: medications  Aggravating factors: walking and lifting    Treatments  Previous treatment: physical therapy  Current treatment: physical therapy  Patient Goals  Patient goals for therapy: decreased pain, increased motion, increased strength, independence with ADLs/IADLs and return to sport/leisure activities          Objective     Postural Observations    Additional Postural Observation Details  Pt ambulates with % time before surgery and since surgery- still ambulating with [facility] for household and community distances - now states he is walking longer distances with less pain symptoms  Gait is with limp and SPC with decreased weightbearing R LE- Gait is improved with equal weightbearing B/L LE's, though slow chi noted- persists    Pt performs bed mobility guarded with rolling technique - slow with sit to/from supine transfer - no increase pain with motion  Pt guarded with sit to stand transfers performing transfer slowly- improving but still transfers slowly with use of SPC        Tenderness     Additional Tenderness Details  TTP R lumbar paraspinals at L4-5 level  - still present  TTP R pirfiromis- still present  No TTP L lumbar paraspinals or piriformis    Neurological Testing     Sensation     Lumbar   Left   Intact: light touch    Right   Intact: light touch    Additional Neurological Details  Pt reports intermittent burning R lateral thigh - persists    Active Range of Motion     Lumbar   Flexion: 30 degrees  with pain  Extension: 14 degrees  with pain  Left lateral flexion: 18 degrees    with pain  Right lateral flexion: 18 degrees     Additional Active Range of Motion Details  Bilateral knee and ankle ROM WFL  Seated hip flex L 102 deg R 100 deg  IR/ER PROM WFL  Unable to cross L over R due to low back pain- improved but still unable  Unable to cross R over L due to low back pain- improved but still unable    SLR: R- 67 deg L 68 deg  Piriformis: unable to obtain piriformis stretch due to back pain with hip flexion at 90 degrees- now able to tolerate piriformis stretching    Strength/Myotome Testing     Additional Strength Details  Bilateral hip flex  R 4+/5 L 4+/5                      abd  R 5/5 L 5/5                      Add  R 4+/5 L 5/5  Knee  Ext  R 4/5  L 4+/5             Flex  R 4+/5 L 4+/5  Ankle   Df    R 4+/5 L 4+/5              PF   R 4+/5 L 4+/5    Abdominal muscle: 4/5  Lumbar parapsinals: 3+/5    Ambulation   Weight-Bearing Status   Assistive device used: single point cane    Additional Weight-Bearing Status Details  SPC for community distances, at times without AD in the house  - persists     Observational Gait   Decreased walking speed  General Comments:      Lumbar Comments  Sitting tolerance 25 minutes with increased pain- he notes sitting tolerance is improved  Walking tolerance 15 minutes with increased pain- same - walking tolerance remains limited but improved over the past month  Difficulty performing bending- he notes that he is unable to bend to reach items on the floor due to pain - however is able put on his own socks and shoes now  Difficulty with lower body dressing due to pain- Independent with dressing  Ascends descends stairs with step to pattern due to back- pt reports improvement in stair negotiation  Foto score: 28- increased to 30 - increased to 36 - decreased to 26 - increased to 30 (2/7/22)               Start Time: 1000  Stop Time: 1115  Total time in clinic (min): 75 minutes         Precautions: 10/22/2019 L3-S1 fusion     Date 2/3 2/7 2/11 2/16 2/2   Visit 26 27 28 29 25   Pain     5   Manuals        Manual calf, hamstring, piriformis str+ sl nerve glide ds ds ds CD ds   Neuro Re-Ed        PPT 30x 3" 30x 3" 30x 3" 30x 3" 30x 3"    PPT w/ march 3# 2/20 3# 2/20 3#  2/20 3# 2/20 3# 2/20   PPT with SLR 3# 2/15 3# 2/15 3# 2/15 3# 2/15 3# 2/15   PPT with opp arm/leg        Wall sags        Seated nerve glides Bilaterally home home home home 10x 5"    Standing bilateral hip flex ext abd        Ther Ex        NASRIN/ROW 30/40 2/15 30/40 2/15 50# 2/15 #50 2/15 30/40# 2/10   Triceps 30# 2/15 30# 2/15 50# 2/15 #50 2/15 30# 2/10   Lat pulldown  NV 30# 2/10 #30 2/10    LTR 3# 2/20 3# 2/20 3# 2/20 3# 2/20 3# 2/20   Supine hip ABD L5 2/15 L5 2/15 L5 2/15 L5 2/15 L5 2/15    Adductor squeeze 30x 3" 30x 3" 30x 3" 30x 3" 30x 3"    LAQ's 20# 2/15 20# 2/15 30# 2/15 30# 2/15 20#  2/10     Hamstring curls 40# 2/15 40#  2/15 50#  2/15 50# 2/15 35# 2/10   Nu-Step 15m L5 15m L5 15m L5 10m L5 L5 12m    Ther Activity        Gait Training        Modalities        HP/ IFC low back, seated 15m 15m 15m 15m 15m

## 2022-02-16 NOTE — LETTER
2022    Nory Stewart PA-C   [facility] [address]    Patient: Jericho Jacob   YOB: 1970   Date of Visit: 2022     Encounter Diagnosis     ICD-10-CM    1  Degenerative disc disease, lumbar  M51 36    2  Chronic bilateral low back pain, unspecified whether sciatica present  M54 50     G89 29    3  S/P lumbar fusion  Z98 1        Dear Dr Kwan Shabazz: Thank you for your recent referral of Jericho Jacob  Please review the attached evaluation summary from Sherwin's recent visit  Please verify that you agree with the plan of care by signing the attached order  If you have any questions or concerns, please do not hesitate to call  I sincerely appreciate the opportunity to share in the care of one of your patients and hope to have another opportunity to work with you in the near future  Sincerely,    Yee Ochoa, PT      Referring Provider:      I certify that I have read the below Plan of Care and certify the need for these services furnished under this plan of treatment while under my care  Nory Stewart PA-C  [address] [facility] [address]  Via Fax: [phone]          PT Re-Evaluation     Today's date: 2022  Patient name: Jericho Jacob  : 1970  MRN: [id number]  Referring provider: Charlotte Shell*  Dx:   Encounter Diagnosis     ICD-10-CM    1  Degenerative disc disease, lumbar  M51 36    2  Chronic bilateral low back pain, unspecified whether sciatica present  M54 50     G89 29    3  S/P lumbar fusion  Z98 1        Start Time: 1000  Stop Time: 1115  Total time in clinic (min): 75 minutes    Assessment  Assessment details: The patient has been seen in PT for a total of 29 visits since [facility]   He has complaints of constant pain along lower back and down RLE, notes increased pain since he has been here for his last treatment, however feels he is doing better overall with improved ability to complete tranfers and functional mobility  He demonstrates deficits with decreased L/S ROM and strength, decreased flexibility, decreased postural awareness, difficulty sleeping  He still has pain with bending forward however no longer need assist with putting on his socks and shoes  He ambulates with [facility] for household and community distances with improved gait mechanics though slow chi noted  He has been able to progress to increased strengthening exercise for postural musculature as well as BLE strength and core stabilization exercise  The patient may benefit from continued PT to address deficits and improve function  Tx to include ROM, stretching, strengthening, modalities, HEP, pt education, postural ed, lifting/body mechanics, neuro re-ed, balance/proprioception Te, MT and equipment  Plan to continue with PT and will progress as able in upcoming visits with ROM, stretching, strengthening, flexibility, postural awareness and HEP  Impairments: abnormal gait, abnormal muscle tone, abnormal or restricted ROM, activity intolerance, impaired physical strength and pain with function    Symptom irritability: moderateUnderstanding of Dx/Px/POC: good   Prognosis: good  Prognosis details: Pt is [place] speaking, spouse present to translate    Goals  STG's to be met in 3-4 weeks:  1  Decrease low back pain and R radicular symptoms by 25%- progressing  2  Increase bilateral L-spine pain by at least 5 degrees and hs flexibility by 5 degrees- met  3  Pt education for proper posture and pelvic positioning during functional activity- met  4  Pt will ambulate with normal gait pattern with equal weightbearing with SPC- partially met - slow chi   5  Abolish TTP R lumbar paraspinals and piriformis- not met    LTG's to be met by discharge:   1  Decreased pain to less than 2/10 with activity- not met  2   Maximize L-spine ROM in all planes and increase SLR bilaterally to 65 degrees- partially met  3  Increase core strength to 4+/5 and LE strength to 5/5 needed for (I) ADL's-  Progressing core at 4/5  4  Increase standing and walking tolerance to at least 45 minutes before increase in pain- progress to 30 minutes and at times longer  5  Improve ability to perform lower body dressing without limitation- progressing  6  Increase Foto score to at least 45- progressing - recent FOTO 30  7  Pt will be (I) with HEP- ongoing and progressing    Plan  Plan details: Modalities and therapy interventions prn  Patient would benefit from: skilled physical therapy  Planned modality interventions: thermotherapy: hydrocollator packs and unattended electrical stimulation  Other planned modality interventions: modalities to be added or modified at therapist discretion  Planned therapy interventions: abdominal trunk stabilization, manual therapy, neuromuscular re-education, patient education, postural training, therapeutic activities, strengthening, stretching, therapeutic exercise, home exercise program, flexibility, balance, gait training and self care  Frequency: 2x week  Duration in weeks: 4  Plan of Care beginning date: 2/16/2022  Plan of Care expiration date: 3/18/2022  Treatment plan discussed with: patient and family        Subjective Evaluation    History of Present Illness  Date of surgery: 10/22/2019  Mechanism of injury: surgery  Mechanism of injury: Pt reports overall since beginning therapy he is a little better but he still has days where he has more pain than other days  UPDATE 12/17/21:  The patient states that therapy has been helping  He still has good days and bad days with regards to pain  He will be going back to see the doctor in October 2022 for his next appointment  UPDATE 1/18/22: The patient's wife present t/o treatment and re-eval   Patient notes increased pain in lower back and into RLE since he was here last for treatment      He has not been in PT since 12/30/21 secondary to having COVID  He now presents back for treatment  His follow up appointment with the doctor is in 2022  UPDATE 22:   Patient presents for treatment with his wife  He notes that pain continues to radiate to the RLE into the foot  He notes that pain is increased this date for unknown reason  He has been able to attend therapy regularly this month             Not a recurrent problem   Quality of life: good    Pain  At best pain rating: 3  At worst pain ratin  Location: low back and R LE to foot  Relieving factors: medications  Aggravating factors: walking and lifting    Treatments  Previous treatment: physical therapy  Current treatment: physical therapy  Patient Goals  Patient goals for therapy: decreased pain, increased motion, increased strength, independence with ADLs/IADLs and return to sport/leisure activities          Objective     Postural Observations    Additional Postural Observation Details  Pt ambulates with % time before surgery and since surgery- still ambulating with SPC for household and community distances - now states he is walking longer distances with less pain symptoms  Gait is with limp and SPC with decreased weightbearing R LE- Gait is improved with equal weightbearing B/L LE's, though slow chi noted- persists    Pt performs bed mobility guarded with rolling technique - slow with sit to/from supine transfer - no increase pain with motion  Pt guarded with sit to stand transfers performing transfer slowly- improving but still transfers slowly with use of SPC        Tenderness     Additional Tenderness Details  TTP R lumbar paraspinals at L4-5 level  - still present  TTP R pirfiromis- still present  No TTP L lumbar paraspinals or piriformis    Neurological Testing     Sensation     Lumbar   Left   Intact: light touch    Right   Intact: light touch    Additional Neurological Details  Pt reports intermittent burning R lateral thigh - persists    Active Range of Motion     Lumbar   Flexion: 30 degrees  with pain  Extension: 14 degrees  with pain  Left lateral flexion: 18 degrees    with pain  Right lateral flexion: 18 degrees     Additional Active Range of Motion Details  Bilateral knee and ankle ROM WFL  Seated hip flex L 102 deg R 100 deg  IR/ER PROM WFL  Unable to cross L over R due to low back pain- improved but still unable  Unable to cross R over L due to low back pain- improved but still unable    SLR: R- 67 deg L 68 deg  Piriformis: unable to obtain piriformis stretch due to back pain with hip flexion at 90 degrees- now able to tolerate piriformis stretching    Strength/Myotome Testing     Additional Strength Details  Bilateral hip flex  R 4+/5 L 4+/5                      abd  R 5/5 L 5/5                      Add  R 4+/5 L 5/5  Knee  Ext  R 4/5  L 4+/5             Flex  R 4+/5 L 4+/5  Ankle   Df    R 4+/5 L 4+/5              PF   R 4+/5 L 4+/5    Abdominal muscle: 4/5  Lumbar parapsinals: 3+/5    Ambulation   Weight-Bearing Status   Assistive device used: single point cane    Additional Weight-Bearing Status Details  SPC for community distances, at times without AD in the house  - persists     Observational Gait   Decreased walking speed       General Comments:      Lumbar Comments  Sitting tolerance 25 minutes with increased pain- he notes sitting tolerance is improved  Walking tolerance 15 minutes with increased pain- same - walking tolerance remains limited but improved over the past month  Difficulty performing bending- he notes that he is unable to bend to reach items on the floor due to pain - however is able put on his own socks and shoes now  Difficulty with lower body dressing due to pain- Independent with dressing  Ascends descends stairs with step to pattern due to back- pt reports improvement in stair negotiation  Foto score: 28- increased to 30 - increased to 36 - decreased to 26 - increased to 30 (2/7/22)               Start Time: 1000  Stop Time: 1115  Total time in clinic (min): 75 minutes         Precautions: 10/22/2019 L3-S1 fusion     Date 2/3 2/7 2/11 2/16 2/2   Visit 26 27 28 29 25   Pain     5   Manuals        Manual calf, hamstring, piriformis str+ sl nerve glide ds ds ds CD ds   Neuro Re-Ed        PPT 30x 3" 30x 3" 30x 3" 30x 3" 30x 3"    PPT w/ march 3# 2/20 3# 2/20 3#  2/20 3# 2/20 3# 2/20   PPT with SLR 3# 2/15 3# 2/15 3# 2/15 3# 2/15 3# 2/15   PPT with opp arm/leg        Wall sags        Seated nerve glides Bilaterally home home home home 10x 5"    Standing bilateral hip flex ext abd        Ther Ex        NASRIN/ROW 30/40 2/15 30/40 2/15 50# 2/15 #50 2/15 30/40# 2/10   Triceps 30# 2/15 30# 2/15 50# 2/15 #50 2/15 30# 2/10   Lat pulldown  NV 30# 2/10 #30 2/10    LTR 3# 2/20 3# 2/20 3# 2/20 3# 2/20 3# 2/20   Supine hip ABD L5 2/15 L5 2/15 L5 2/15 L5 2/15 L5 2/15    Adductor squeeze 30x 3" 30x 3" 30x 3" 30x 3" 30x 3"    LAQ's 20# 2/15 20# 2/15 30# 2/15 30# 2/15 20#  2/10     Hamstring curls 40# 2/15 40#  2/15 50#  2/15 50# 2/15 35# 2/10   Nu-Step 15m L5 15m L5 15m L5 10m L5 L5 12m    Ther Activity        Gait Training        Modalities        HP/ IFC low back, seated 15m 15m 15m 15m 15m

## 2022-02-18 ENCOUNTER — OFFICE VISIT (OUTPATIENT)
Dept: PHYSICAL THERAPY | Facility: CLINIC | Age: 52
End: 2022-02-18
Payer: OTHER MISCELLANEOUS

## 2022-02-18 DIAGNOSIS — M51.36 DEGENERATIVE DISC DISEASE, LUMBAR: Primary | ICD-10-CM

## 2022-02-18 DIAGNOSIS — G89.29 CHRONIC BILATERAL LOW BACK PAIN, UNSPECIFIED WHETHER SCIATICA PRESENT: ICD-10-CM

## 2022-02-18 DIAGNOSIS — Z98.1 S/P LUMBAR FUSION: ICD-10-CM

## 2022-02-18 DIAGNOSIS — M54.50 CHRONIC BILATERAL LOW BACK PAIN, UNSPECIFIED WHETHER SCIATICA PRESENT: ICD-10-CM

## 2022-02-18 PROCEDURE — 97140 MANUAL THERAPY 1/> REGIONS: CPT | Performed by: PHYSICAL THERAPIST

## 2022-02-18 PROCEDURE — 97014 ELECTRIC STIMULATION THERAPY: CPT | Performed by: PHYSICAL THERAPIST

## 2022-02-18 PROCEDURE — 97110 THERAPEUTIC EXERCISES: CPT | Performed by: PHYSICAL THERAPIST

## 2022-02-18 NOTE — PROGRESS NOTES
Daily Note     Today's date: 2022  Patient name: Celine Medellin  : 1970  MRN: [id number]  Referring provider: Negrita Henderson*  Dx:   Encounter Diagnosis     ICD-10-CM    1  Degenerative disc disease, lumbar  M51 36    2  Chronic bilateral low back pain, unspecified whether sciatica present  M54 50     G89 29    3  S/P lumbar fusion  Z98 1                   Subjective: Patient reports that he is doing okay today, offers no new complaints at start of session  Objective: See treatment diary below      Assessment: Tolerated treatment well with no increase in pain noted during session today  Patient demonstrated fatigue post treatment and would benefit from continued PT  Continued PT would be beneficial to improve function  Plan: Continue per plan of care            Precautions: 10/22/2019 L3-S1 fusion     Date 2/3 2/7 2/11 2/16 2/18   Visit 26 27 28 29 30   Pain        Manuals        Manual calf, hamstring, piriformis str+ sl nerve glide ds ds ds CD ML   Neuro Re-Ed        PPT 30x 3" 30x 3" 30x 3" 30x 3" 30x 3"    PPT / march 3# 2/ 3# 2 3#  2 3# 2 3# 2/20   PPT with SLR 3# 2/15 3# 2/15 3# 2/15 3# 2/15 3# 2/15   PPT with opp arm/leg        Wall sags        Seated nerve glides Bilaterally home home home home home   Standing bilateral hip flex ext abd        Ther Ex        NASRIN/ROW 30/40 2/15 30/40 2/15 50# 2/15 #50 2/15 50# 2/15 ea   Triceps 30# 2/15 30# 2/15 50# 2/15 #50 2/15 50# 2/15   Lat pulldown  NV 30# 2/10 #30 2/10 30# 2/10   LTR 3# 2/20 3# 2/20 3# 2/20 3# 2/ 3# 2/20   Supine hip ABD L5 2/15 L5 2/15 L5 2/15 L5 2/15 L5 2/15    Adductor squeeze 30x 3" 30x 3" 30x 3" 30x 3" 30x 3"    LAQ's 20# 2/15 20# 2/15 30# 2/15 30# 2/15 30# 2/15     Hamstring curls 40# 2/15 40#  2/15 50#  2/15 50# 2/15 50# 2/15   Nu-Step 15m L5 15m L5 15m L5 10m L5 L5 10 mins   Ther Activity        Gait Training        Modalities        HP/ IFC low back, seated 15m 15m 15m 15m 15 mins

## 2022-02-23 ENCOUNTER — OFFICE VISIT (OUTPATIENT)
Dept: PHYSICAL THERAPY | Facility: CLINIC | Age: 52
End: 2022-02-23
Payer: OTHER MISCELLANEOUS

## 2022-02-23 DIAGNOSIS — M54.50 CHRONIC BILATERAL LOW BACK PAIN, UNSPECIFIED WHETHER SCIATICA PRESENT: ICD-10-CM

## 2022-02-23 DIAGNOSIS — G89.29 CHRONIC BILATERAL LOW BACK PAIN, UNSPECIFIED WHETHER SCIATICA PRESENT: ICD-10-CM

## 2022-02-23 DIAGNOSIS — M51.36 DEGENERATIVE DISC DISEASE, LUMBAR: Primary | ICD-10-CM

## 2022-02-23 DIAGNOSIS — Z98.1 S/P LUMBAR FUSION: ICD-10-CM

## 2022-02-23 PROCEDURE — 97110 THERAPEUTIC EXERCISES: CPT

## 2022-02-23 PROCEDURE — 97014 ELECTRIC STIMULATION THERAPY: CPT

## 2022-02-23 PROCEDURE — 97112 NEUROMUSCULAR REEDUCATION: CPT

## 2022-02-23 PROCEDURE — 97140 MANUAL THERAPY 1/> REGIONS: CPT

## 2022-02-23 PROCEDURE — 97010 HOT OR COLD PACKS THERAPY: CPT

## 2022-02-23 NOTE — PROGRESS NOTES
Daily Note     Today's date: 2022  Patient name: Bryce Mancilla  : 1970  MRN: [id number]  Referring provider: Alberto Case*  Dx:   Encounter Diagnosis     ICD-10-CM    1  Degenerative disc disease, lumbar  M51 36    2  Chronic bilateral low back pain, unspecified whether sciatica present  M54 50     G89 29    3  S/P lumbar fusion  Z98 1                   Subjective: Patient notes that mild pain does persist in the back and RLE  He notes the exercise      Objective: See treatment diary below      Assessment: Tolerated treatment well  Patient would benefit from continued PT for progression of strengthening program for the BLE and core stabilization exercise  Patient continues to be eager to improve and compliant with PT intervention  Plan: Continue per plan of care            Precautions: 10/22/2019 L3-S1 fusion     Date    Visit 31 27 28 29 30   Pain        Manuals        Manual calf, hamstring, piriformis str+ sl nerve glide CD ds ds CD ML   Neuro Re-Ed        PPT 30x 3" 30x 3" 30x 3" 30x 3" 30x 3"    PPT / march 3# 2/ 3# 2 3#  2 3#  3# 2/20   PPT with SLR 3# 2/15 3# 2/15 3# 2/15 3# 2/15 3# 2/15   PPT with opp arm/leg        Wall sags        Seated nerve glides Bilaterally home home home home home   Standing bilateral hip flex ext abd        Ther Ex        NASRIN/ROW 50# 2/15 30/40 2/15 50# 2/15 #50 2/15 50# 2/15 ea   Triceps 50# 2/15 30# 2/15 50# 2/15 #50 2/15 50# 2/15   Lat pulldown 30# 2/10 NV 30# 2/10 #30 2/10 30# 2/10   LTR 3# 2/20 3# 2/20 3# 2/ 3# 2/20 3# 2/20   Supine hip ABD L5 2/15 L5 2/15 L5 2/15 L5 2/15 L5 2/15    Adductor squeeze 30x 3" 30x 3" 30x 3" 30x 3" 30x 3"    LAQ's 30# 2/15 20# 2/15 30# 2/15 30# 2/15 30# 2/15     Hamstring curls 50# 2/15 40#  2/15 50#  2/15 50# 2/15 50# 2/15   Nu-Step 15m L5 15m L5 15m L5 10m L5 L5 10 mins   Ther Activity        Gait Training        Modalities        HP/ IFC low back, seated 15m 15m 15m 15m 15 mins

## 2022-02-25 ENCOUNTER — APPOINTMENT (OUTPATIENT)
Dept: PHYSICAL THERAPY | Facility: CLINIC | Age: 52
End: 2022-02-25
Payer: OTHER MISCELLANEOUS

## 2022-02-28 ENCOUNTER — OFFICE VISIT (OUTPATIENT)
Dept: PHYSICAL THERAPY | Facility: CLINIC | Age: 52
End: 2022-02-28
Payer: OTHER MISCELLANEOUS

## 2022-02-28 DIAGNOSIS — G89.29 CHRONIC BILATERAL LOW BACK PAIN, UNSPECIFIED WHETHER SCIATICA PRESENT: ICD-10-CM

## 2022-02-28 DIAGNOSIS — M54.50 CHRONIC BILATERAL LOW BACK PAIN, UNSPECIFIED WHETHER SCIATICA PRESENT: ICD-10-CM

## 2022-02-28 DIAGNOSIS — M51.36 DEGENERATIVE DISC DISEASE, LUMBAR: Primary | ICD-10-CM

## 2022-02-28 DIAGNOSIS — Z98.1 S/P LUMBAR FUSION: ICD-10-CM

## 2022-02-28 PROCEDURE — 97112 NEUROMUSCULAR REEDUCATION: CPT

## 2022-02-28 PROCEDURE — 97140 MANUAL THERAPY 1/> REGIONS: CPT

## 2022-02-28 PROCEDURE — 97014 ELECTRIC STIMULATION THERAPY: CPT

## 2022-02-28 PROCEDURE — 97110 THERAPEUTIC EXERCISES: CPT

## 2022-02-28 NOTE — PROGRESS NOTES
Daily Note     Today's date: 2022  Patient name: Destin Sood  : 1970  MRN: [id number]  Referring provider: Funmilayo Keita*  Dx:   Encounter Diagnosis     ICD-10-CM    1  Degenerative disc disease, lumbar  M51 36    2  Chronic bilateral low back pain, unspecified whether sciatica present  M54 50     G89 29    3  S/P lumbar fusion  Z98 1        Start Time: 0900  Stop Time: 1015  Total time in clinic (min): 75 minutes    Subjective: Pt notes a little better with his LBP  Objective: See treatment diary below  FOTO 32      Assessment: Tolerated treatment well  Patient exhibited good technique with therapeutic exercises      Plan: Continue per plan of care            Precautions: 10/22/2019 L3-S1 fusion     Date    Visit 31 32 28 29 30   Pain        Manuals        Manual calf, hamstring, piriformis str+ sl nerve glide CD cd ds CD ML   Neuro Re-Ed        PPT 30x 3" 30x 3" 30x 3" 30x 3" 30x 3"    PPT / march 3# 2/20 3# 2/ 3#  2 3#  3# 2/20   PPT with SLR 3# 2/15 3# 2/15 3# 2/15 3# 2/15 3# 2/15   PPT with opp arm/leg        Wall sags        Seated nerve glides Bilaterally home home home home home   Standing bilateral hip flex ext abd        Ther Ex        NASRIN/ROW 50# 2/15 50# 2/15 50# 2/15 #50 2/15 50# 2/15 ea   Triceps 50# 2/15 50# 2/15 50# 2/15 #50 2/15 50# 2/15   Lat pulldown 30# 2/10 30# 2/15 30# 2/10 #30 2/10 30# 2/10   LTR 3# 2/20 3# 2/20 3# 2/20 3# 2/20 3# 2/20   Supine hip ABD L5 2/15 L5 2/15 L5 2/15 L5 2/15 L5 2/15    Adductor squeeze 30x 3" 30x 3" 30x 3" 30x 3" 30x 3"    LAQ's 30# 2/15 25# 2/15 30# 2/15 30# 2/15 30# 2/15     Hamstring curls 50# 2/15 55#  2/15 50#  2/15 50# 2/15 50# 2/15   Nu-Step 15m L5 15m L5 15m L5 10m L5 L5 10 mins   Ther Activity        Gait Training        Modalities        HP/ IFC low back, seated 15m 15m 15m 15m 15 mins

## 2022-03-02 ENCOUNTER — OFFICE VISIT (OUTPATIENT)
Dept: PHYSICAL THERAPY | Facility: CLINIC | Age: 52
End: 2022-03-02
Payer: OTHER MISCELLANEOUS

## 2022-03-02 DIAGNOSIS — M51.36 DEGENERATIVE DISC DISEASE, LUMBAR: Primary | ICD-10-CM

## 2022-03-02 DIAGNOSIS — Z98.1 S/P LUMBAR FUSION: ICD-10-CM

## 2022-03-02 DIAGNOSIS — G89.29 CHRONIC BILATERAL LOW BACK PAIN, UNSPECIFIED WHETHER SCIATICA PRESENT: ICD-10-CM

## 2022-03-02 DIAGNOSIS — M54.50 CHRONIC BILATERAL LOW BACK PAIN, UNSPECIFIED WHETHER SCIATICA PRESENT: ICD-10-CM

## 2022-03-02 PROCEDURE — 97140 MANUAL THERAPY 1/> REGIONS: CPT

## 2022-03-02 PROCEDURE — 97014 ELECTRIC STIMULATION THERAPY: CPT

## 2022-03-02 PROCEDURE — 97112 NEUROMUSCULAR REEDUCATION: CPT

## 2022-03-02 PROCEDURE — 97110 THERAPEUTIC EXERCISES: CPT

## 2022-03-02 NOTE — PROGRESS NOTES
Daily Note     Today's date: 3/2/2022  Patient name: La Nena Stewart  : 1970  MRN: [id number]  Referring provider: Kavin Abbott*  Dx:   Encounter Diagnosis     ICD-10-CM    1  Degenerative disc disease, lumbar  M51 36    2  Chronic bilateral low back pain, unspecified whether sciatica present  M54 50     G89 29    3  S/P lumbar fusion  Z98 1                   Subjective: patient stated that his back is feeling a little better  He is having less LE pain  Objective: See treatment diary below      Assessment: Tolerated treatment well  Continues POC with focus on BLE strengthening and core stability  Patient declined to trial increased weight for quad and HS strengthening today  Good form with tasks at CC, but required cuing to TrA activation, good carryover  Good tolerance to table program, intermittent cuing for appropriate core activation required  Patient continues to benefit from manuals to address BLE restrictions  Continued PT would be beneficial to improve function           Plan: Continue per plan of care        Precautions: 10/22/2019 L3-S1 fusion     Date 2/23 2/28 3/2 2/16 2/18   Visit 31 32 33 29 30   Pain        Manuals        Manual calf, hamstring, piriformis str+ sl nerve glide CD cd MSB CD ML   Neuro Re-Ed        PPT 30x 3" 30x 3" 30x3" 30x 3" 30x 3"    PPT / march 3# 2/ 3# 2/ 3# 2 3#  3# 2/20   PPT with SLR 3# 2/15 3# 2/15 3# 2x15 3# 2/15 3# 2/15   PPT with opp arm/leg        Wall sags        Seated nerve glides Bilaterally home home Home   home home   Standing bilateral hip flex ext abd        Ther Ex        NASRIN/ROW 50# 2/15 50# 2/15 50# 2x15 #50 2/15 50# 2/15 ea   Triceps 50# 2/15 50# 2/15 30# 2x15 #50 2/15 50# 2/15   Lat pulldown 30# 2/10 30# 2/15 30# 2x15 #30 2/10 30# 2/10   LTR 3# 2/20 3# 2/20 3# 2x20 3# 2/20 3# 2/20   Supine hip ABD L5 2/15 L5 2/15 L5 2x15 L5 2/15 L5 2/15    Adductor squeeze 30x 3" 30x 3" 30x3" 30x 3" 30x 3"    LAQ's 30# 2/15 25# 2/15 25# 2x15 30# 2/15 30# 2/15     Hamstring curls 50# 2/15 55#  2/15 55# 2x15 50# 2/15 50# 2/15   Nu-Step 15m L5 15m L5 15m L5 10m L5 L5 10 mins   Ther Activity        Gait Training        Modalities        HP/ IFC low back, seated 15m 15m 15m 15m 15 mins

## 2022-03-09 ENCOUNTER — OFFICE VISIT (OUTPATIENT)
Dept: PHYSICAL THERAPY | Facility: CLINIC | Age: 52
End: 2022-03-09
Payer: OTHER MISCELLANEOUS

## 2022-03-09 DIAGNOSIS — Z98.1 S/P LUMBAR FUSION: ICD-10-CM

## 2022-03-09 DIAGNOSIS — M51.36 DEGENERATIVE DISC DISEASE, LUMBAR: Primary | ICD-10-CM

## 2022-03-09 DIAGNOSIS — G89.29 CHRONIC BILATERAL LOW BACK PAIN, UNSPECIFIED WHETHER SCIATICA PRESENT: ICD-10-CM

## 2022-03-09 DIAGNOSIS — M54.50 CHRONIC BILATERAL LOW BACK PAIN, UNSPECIFIED WHETHER SCIATICA PRESENT: ICD-10-CM

## 2022-03-09 PROCEDURE — 97110 THERAPEUTIC EXERCISES: CPT

## 2022-03-09 PROCEDURE — 97112 NEUROMUSCULAR REEDUCATION: CPT

## 2022-03-09 PROCEDURE — 97140 MANUAL THERAPY 1/> REGIONS: CPT

## 2022-03-09 PROCEDURE — 97014 ELECTRIC STIMULATION THERAPY: CPT

## 2022-03-09 NOTE — PROGRESS NOTES
Addendum for D/C 3/9/22 -   Patient is appropriate for d/c at this time  He is agreeable with d/c and feels knowledgeable with his HEP  He notes prolonged walking continues to cause symptoms but is otherwise feeling less pain with normal daily activity  The patient has been encouraged to continue with exercise regularly  Patient is d/c to HEP at this time

## 2022-03-09 NOTE — PROGRESS NOTES
Daily Note     Today's date: 3/9/2022  Patient name: Casey Marcus  : 1970  MRN: [id number]  Referring provider: Daniel Anna*  Dx:   Encounter Diagnosis     ICD-10-CM    1  Degenerative disc disease, lumbar  M51 36    2  Chronic bilateral low back pain, unspecified whether sciatica present  M54 50     G89 29    3  S/P lumbar fusion  Z98 1        Start Time: 0930  Stop Time: 1045  Total time in clinic (min): 75 minutes    Subjective:Pt notes maintenance  of his decreased LBP    Objective: See treatment diary below      Assessment: Tolerated treatment well  Patient exhibited good technique with therapeutic exercises      Plan: Continue per plan of care        Precautions: 10/22/2019 L3-S1 fusion     Date 2/23 2/28 3/2 3/9 2/18   Visit 31 32 33 34 30   Pain        Manuals        Manual calf, hamstring, piriformis str+ sl nerve glide CD cd MSB ds ML   Neuro Re-Ed        PPT 30x 3" 30x 3" 30x3" 30x 3" 30x 3"    PPT / march 3# 2/20 3# 2/20 3# 2/20 3# 2/20 3# 2/20   PPT with SLR 3# 2/15 3# 2/15 3# 2x15 3# 2/15 3# 2/15   Ther Ex        NASRIN/ROW 50# 2/15 50# 2/15 50# 2x15 #50 2/15 50# 2/15 ea   Triceps 50# 2/15 50# 2/15 30# 2x15 #50 2/15 50# 2/15   Lat pulldown 30# 2/10 30# 2/15 30# 2x15 #30 2/10 30# 2/10   LTR 3# 2/20 3# 2/20 3# 2x20 3# 2/20 3# 2/20   Supine hip ABD L5 2/15 L5 2/15 L5 2x15 L5 2/15 L5 2/15    Adductor squeeze 30x 3" 30x 3" 30x3" 30x 3" 30x 3"    LAQ's 30# 2/15 25# 2/15 25# 2x15 25# 2/15 30# 2/15     Hamstring curls 50# 2/15 55#  2/15 55# 2x15 55# 2/15 50# 2/15   Nu-Step 15m L5 15m L5 15m L5 10m L5 L5 10 mins   Ther Activity        Gait Training        Modalities        HP/ IFC low back, seated 15m 15m 15m 15m 15 mins